# Patient Record
Sex: FEMALE | Race: BLACK OR AFRICAN AMERICAN | Employment: OTHER | URBAN - METROPOLITAN AREA
[De-identification: names, ages, dates, MRNs, and addresses within clinical notes are randomized per-mention and may not be internally consistent; named-entity substitution may affect disease eponyms.]

---

## 2020-08-24 PROBLEM — M54.41 CHRONIC RIGHT-SIDED LOW BACK PAIN WITH RIGHT-SIDED SCIATICA: Status: ACTIVE | Noted: 2020-08-24

## 2020-08-24 PROBLEM — E11.42 DIABETIC POLYNEUROPATHY ASSOCIATED WITH TYPE 2 DIABETES MELLITUS (HCC): Status: ACTIVE | Noted: 2020-08-24

## 2020-08-24 PROBLEM — G89.29 CHRONIC RIGHT-SIDED LOW BACK PAIN WITH RIGHT-SIDED SCIATICA: Status: ACTIVE | Noted: 2020-08-24

## 2020-09-29 ENCOUNTER — HOSPITAL ENCOUNTER (OUTPATIENT)
Dept: LAB | Age: 65
Discharge: HOME | End: 2020-09-29
Attending: PSYCHIATRY & NEUROLOGY
Payer: MEDICARE

## 2020-09-29 DIAGNOSIS — E11.42 DIABETIC POLYNEUROPATHY ASSOCIATED WITH TYPE 2 DIABETES MELLITUS (HCC): ICD-10-CM

## 2020-09-29 LAB
EST. AVERAGE GLUCOSE BLD GHB EST-MCNC: 212 MG/DL
HBA1C MFR BLD: 9 %
T4 FREE SERPL-MCNC: 1.1 NG/DL (ref 0.78–1.46)
VIT B12 SERPL-MCNC: 528 PG/ML (ref 193–986)

## 2020-09-29 PROCEDURE — 84439 ASSAY OF FREE THYROXINE: CPT

## 2020-09-29 PROCEDURE — 86334 IMMUNOFIX E-PHORESIS SERUM: CPT

## 2020-09-29 PROCEDURE — 36415 COLL VENOUS BLD VENIPUNCTURE: CPT

## 2020-09-29 PROCEDURE — 84165 PROTEIN E-PHORESIS SERUM: CPT

## 2020-09-29 PROCEDURE — 82607 VITAMIN B-12: CPT

## 2020-09-29 PROCEDURE — 83036 HEMOGLOBIN GLYCOSYLATED A1C: CPT

## 2020-09-29 NOTE — PROGRESS NOTES
Please notify patient that her hemoglobin A1c was high at 9.0%, she needs to work on diabetic control with primary care physician.

## 2020-09-30 LAB
ALBUMIN SERPL ELPH-MCNC: 3.68 G/DL (ref 3.2–5.6)
ALBUMIN/GLOB SERPL: 1.2 {RATIO}
ALPHA1 GLOB SERPL ELPH-MCNC: 0.18 G/DL (ref 0.1–0.4)
ALPHA2 GLOB SERPL ELPH-MCNC: 0.84 G/DL (ref 0.4–1.2)
B-GLOBULIN SERPL QL ELPH: 1.07 G/DL (ref 0.6–1.3)
GAMMA GLOB MFR SERPL ELPH: 1.04 G/DL (ref 0.5–1.6)
IGA SERPL-MCNC: 113 MG/DL (ref 85–499)
IGG SERPL-MCNC: 940 MG/DL (ref 610–1616)
IGM SERPL-MCNC: 39 MG/DL (ref 35–242)
M PROTEIN SERPL ELPH-MCNC: NORMAL G/DL
PROT PATTERN SERPL ELPH-IMP: NORMAL
PROT PATTERN SPEC IFE-IMP: NORMAL
PROT SERPL-MCNC: 6.8 G/DL (ref 6.3–8.2)

## 2020-10-01 NOTE — PROGRESS NOTES
B12, T4 thyroxin were normal.  Protein electrophoresis was essentially normal but reported a faint band, will check serum light chain, could you call pt and place lab order, then mail to pt, no fasting needed, thx

## 2020-10-02 ENCOUNTER — HOSPITAL ENCOUNTER (OUTPATIENT)
Dept: GENERAL RADIOLOGY | Age: 65
Discharge: HOME | End: 2020-10-02
Attending: PSYCHIATRY & NEUROLOGY
Payer: MEDICARE

## 2020-10-02 DIAGNOSIS — M25.551 RIGHT HIP PAIN: ICD-10-CM

## 2020-10-02 DIAGNOSIS — G89.29 CHRONIC RIGHT-SIDED LOW BACK PAIN WITH RIGHT-SIDED SCIATICA: ICD-10-CM

## 2020-10-02 DIAGNOSIS — M54.18 RADICULAR PAIN OF SACRUM: ICD-10-CM

## 2020-10-02 DIAGNOSIS — M54.41 CHRONIC RIGHT-SIDED LOW BACK PAIN WITH RIGHT-SIDED SCIATICA: ICD-10-CM

## 2020-10-02 PROCEDURE — 72200 X-RAY EXAM SI JOINTS: CPT

## 2020-10-02 PROCEDURE — 73502 X-RAY EXAM HIP UNI 2-3 VIEWS: CPT

## 2020-10-02 PROCEDURE — 72114 X-RAY EXAM L-S SPINE BENDING: CPT

## 2020-10-02 PROCEDURE — 72220 X-RAY EXAM SACRUM TAILBONE: CPT

## 2020-11-18 PROBLEM — M25.559 HIP PAIN: Status: ACTIVE | Noted: 2020-11-18

## 2020-11-18 PROBLEM — E11.42 DIABETIC SENSORY POLYNEUROPATHY (HCC): Status: ACTIVE | Noted: 2020-11-18

## 2022-03-18 PROBLEM — E11.42 DIABETIC SENSORY POLYNEUROPATHY (HCC): Status: ACTIVE | Noted: 2020-11-18

## 2022-03-19 PROBLEM — G89.29 CHRONIC RIGHT-SIDED LOW BACK PAIN WITH RIGHT-SIDED SCIATICA: Status: ACTIVE | Noted: 2020-08-24

## 2022-03-19 PROBLEM — M25.559 HIP PAIN: Status: ACTIVE | Noted: 2020-11-18

## 2022-03-19 PROBLEM — M54.41 CHRONIC RIGHT-SIDED LOW BACK PAIN WITH RIGHT-SIDED SCIATICA: Status: ACTIVE | Noted: 2020-08-24

## 2022-08-10 ENCOUNTER — APPOINTMENT (OUTPATIENT)
Dept: CT IMAGING | Age: 67
DRG: 177 | End: 2022-08-10
Payer: MEDICARE

## 2022-08-10 ENCOUNTER — HOSPITAL ENCOUNTER (EMERGENCY)
Age: 67
Discharge: ANOTHER HOSPITAL | DRG: 177 | End: 2022-08-10
Attending: EMERGENCY MEDICINE
Payer: MEDICARE

## 2022-08-10 ENCOUNTER — HOSPITAL ENCOUNTER (INPATIENT)
Age: 67
LOS: 1 days | Discharge: HOME | DRG: 177 | End: 2022-08-13
Attending: FAMILY MEDICINE | Admitting: FAMILY MEDICINE
Payer: MEDICARE

## 2022-08-10 ENCOUNTER — APPOINTMENT (OUTPATIENT)
Dept: GENERAL RADIOLOGY | Age: 67
DRG: 177 | End: 2022-08-10
Payer: MEDICARE

## 2022-08-10 VITALS
DIASTOLIC BLOOD PRESSURE: 67 MMHG | OXYGEN SATURATION: 92 % | HEIGHT: 67 IN | RESPIRATION RATE: 18 BRPM | BODY MASS INDEX: 31.81 KG/M2 | SYSTOLIC BLOOD PRESSURE: 108 MMHG | TEMPERATURE: 99.5 F | HEART RATE: 89 BPM | WEIGHT: 202.7 LBS

## 2022-08-10 DIAGNOSIS — N17.9 AKI (ACUTE KIDNEY INJURY) (HCC): ICD-10-CM

## 2022-08-10 DIAGNOSIS — U07.1 COVID: Primary | ICD-10-CM

## 2022-08-10 DIAGNOSIS — R29.90 STROKE-LIKE SYMPTOMS: Primary | ICD-10-CM

## 2022-08-10 PROBLEM — E66.09 CLASS 1 OBESITY DUE TO EXCESS CALORIES WITH SERIOUS COMORBIDITY AND BODY MASS INDEX (BMI) OF 31.0 TO 31.9 IN ADULT: Chronic | Status: ACTIVE | Noted: 2022-08-10

## 2022-08-10 PROBLEM — E11.42 DIABETIC SENSORY POLYNEUROPATHY (HCC): Status: ACTIVE | Noted: 2020-11-18

## 2022-08-10 PROBLEM — I10 HYPERTENSION, BENIGN: Status: ACTIVE | Noted: 2022-04-08

## 2022-08-10 PROBLEM — M51.369 DDD (DEGENERATIVE DISC DISEASE), LUMBAR: Status: ACTIVE | Noted: 2021-03-23

## 2022-08-10 PROBLEM — N18.31 ACUTE RENAL FAILURE SUPERIMPOSED ON STAGE 3A CHRONIC KIDNEY DISEASE (HCC): Status: ACTIVE | Noted: 2022-08-10

## 2022-08-10 PROBLEM — E78.2 MIXED HYPERLIPIDEMIA: Status: ACTIVE | Noted: 2022-04-08

## 2022-08-10 PROBLEM — M51.35 DEGENERATION OF THORACOLUMBAR INTERVERTEBRAL DISC: Status: ACTIVE | Noted: 2022-04-08

## 2022-08-10 PROBLEM — E66.811 CLASS 1 OBESITY DUE TO EXCESS CALORIES WITH SERIOUS COMORBIDITY AND BODY MASS INDEX (BMI) OF 31.0 TO 31.9 IN ADULT: Chronic | Status: ACTIVE | Noted: 2022-08-10

## 2022-08-10 PROBLEM — E11.65 UNCONTROLLED TYPE 2 DIABETES MELLITUS WITH HYPERGLYCEMIA (HCC): Status: ACTIVE | Noted: 2022-04-08

## 2022-08-10 PROBLEM — M51.36 DDD (DEGENERATIVE DISC DISEASE), LUMBAR: Status: ACTIVE | Noted: 2021-03-23

## 2022-08-10 PROBLEM — M50.122 CERVICAL DISC DISORDER AT C5-C6 LEVEL WITH RADICULOPATHY: Status: ACTIVE | Noted: 2022-07-12

## 2022-08-10 PROBLEM — J96.01 ACUTE HYPOXEMIC RESPIRATORY FAILURE DUE TO COVID-19 (HCC): Status: ACTIVE | Noted: 2022-08-10

## 2022-08-10 LAB
ALBUMIN SERPL-MCNC: 4.4 G/DL (ref 3.2–4.6)
ALBUMIN/GLOB SERPL: 1.5 {RATIO}
ALP SERPL-CCNC: 92 U/L (ref 45–117)
ALT SERPL-CCNC: 14 U/L (ref 13–61)
AMPHET UR QL SCN: NEGATIVE
ANION GAP SERPL CALC-SCNC: 10 MMOL/L (ref 7–16)
AST SERPL-CCNC: 20 U/L (ref 15–37)
BARBITURATES UR QL SCN: NEGATIVE
BASOPHILS # BLD: 0 K/UL (ref 0–0.2)
BASOPHILS NFR BLD: 0 % (ref 0–2)
BENZODIAZ UR QL: NEGATIVE
BILIRUB SERPL-MCNC: 0.4 MG/DL (ref 0.2–1.1)
BUN SERPL-MCNC: 39 MG/DL (ref 7–18)
CALCIUM SERPL-MCNC: 9.3 MG/DL (ref 8.3–10.4)
CANNABINOIDS UR QL SCN: NEGATIVE
CHLORIDE SERPL-SCNC: 94 MMOL/L (ref 98–107)
CO2 SERPL-SCNC: 32 MMOL/L (ref 21–32)
COCAINE UR QL SCN: NEGATIVE
CREAT SERPL-MCNC: 1.87 MG/DL (ref 0.6–1)
DIFFERENTIAL METHOD BLD: ABNORMAL
EOSINOPHIL # BLD: 0.2 K/UL (ref 0–0.8)
EOSINOPHIL NFR BLD: 2 % (ref 0.5–7.8)
ERYTHROCYTE [DISTWIDTH] IN BLOOD BY AUTOMATED COUNT: 13 % (ref 11.9–14.6)
ETHANOL SERPL-MCNC: <10 MG/DL (ref 0–0.08)
FLUAV AG NPH QL IA: NEGATIVE
FLUBV AG NPH QL IA: NEGATIVE
GLOBULIN SER CALC-MCNC: 3 G/DL (ref 2.3–3.5)
GLUCOSE BLD STRIP.AUTO-MCNC: 297 MG/DL (ref 65–100)
GLUCOSE BLD STRIP.AUTO-MCNC: 76 MG/DL (ref 65–100)
GLUCOSE SERPL-MCNC: 70 MG/DL (ref 65–100)
HCT VFR BLD AUTO: 35.8 % (ref 35.8–46.3)
HGB BLD-MCNC: 11.5 G/DL (ref 11.7–15.4)
IMM GRANULOCYTES # BLD AUTO: 0 K/UL (ref 0–0.5)
IMM GRANULOCYTES NFR BLD AUTO: 0 % (ref 0–5)
LYMPHOCYTES # BLD: 2.2 K/UL (ref 0.5–4.6)
LYMPHOCYTES NFR BLD: 28 % (ref 13–44)
MCH RBC QN AUTO: 27.3 PG (ref 26.1–32.9)
MCHC RBC AUTO-ENTMCNC: 32.1 G/DL (ref 31.4–35)
MCV RBC AUTO: 85 FL (ref 79.6–97.8)
METHADONE UR QL: NEGATIVE
MONOCYTES # BLD: 0.6 K/UL (ref 0.1–1.3)
MONOCYTES NFR BLD: 8 % (ref 4–12)
NEUTS SEG # BLD: 5 K/UL (ref 1.7–8.2)
NEUTS SEG NFR BLD: 62 % (ref 43–78)
NRBC # BLD: 0 K/UL (ref 0–0.2)
NT PRO BNP: 116 PG/ML (ref 0–450)
OPIATES UR QL: POSITIVE
PCP UR QL: NEGATIVE
PLATELET # BLD AUTO: 237 K/UL (ref 150–450)
PMV BLD AUTO: 10.6 FL (ref 9.4–12.3)
POTASSIUM SERPL-SCNC: 3.8 MMOL/L (ref 3.5–5.1)
PROT SERPL-MCNC: 7.4 G/DL (ref 6.4–8.2)
RBC # BLD AUTO: 4.21 M/UL (ref 4.05–5.2)
SARS-COV-2 RDRP RESP QL NAA+PROBE: DETECTED
SERVICE CMNT-IMP: ABNORMAL
SERVICE CMNT-IMP: NORMAL
SODIUM SERPL-SCNC: 136 MMOL/L (ref 136–145)
SOURCE: ABNORMAL
SPECIMEN SOURCE: NORMAL
TROPONIN T SERPL HS-MCNC: 16.6 NG/L (ref 0–14)
WBC # BLD AUTO: 8.1 K/UL (ref 4.3–11.1)

## 2022-08-10 PROCEDURE — 85025 COMPLETE CBC W/AUTO DIFF WBC: CPT

## 2022-08-10 PROCEDURE — 87804 INFLUENZA ASSAY W/OPTIC: CPT

## 2022-08-10 PROCEDURE — 6370000000 HC RX 637 (ALT 250 FOR IP): Performed by: EMERGENCY MEDICINE

## 2022-08-10 PROCEDURE — 71045 X-RAY EXAM CHEST 1 VIEW: CPT

## 2022-08-10 PROCEDURE — 96365 THER/PROPH/DIAG IV INF INIT: CPT

## 2022-08-10 PROCEDURE — G0378 HOSPITAL OBSERVATION PER HR: HCPCS

## 2022-08-10 PROCEDURE — 82077 ASSAY SPEC XCP UR&BREATH IA: CPT

## 2022-08-10 PROCEDURE — 83880 ASSAY OF NATRIURETIC PEPTIDE: CPT

## 2022-08-10 PROCEDURE — 94760 N-INVAS EAR/PLS OXIMETRY 1: CPT

## 2022-08-10 PROCEDURE — 6360000002 HC RX W HCPCS: Performed by: EMERGENCY MEDICINE

## 2022-08-10 PROCEDURE — 80053 COMPREHEN METABOLIC PANEL: CPT

## 2022-08-10 PROCEDURE — 2580000003 HC RX 258: Performed by: EMERGENCY MEDICINE

## 2022-08-10 PROCEDURE — 6370000000 HC RX 637 (ALT 250 FOR IP): Performed by: FAMILY MEDICINE

## 2022-08-10 PROCEDURE — 87635 SARS-COV-2 COVID-19 AMP PRB: CPT

## 2022-08-10 PROCEDURE — 80307 DRUG TEST PRSMV CHEM ANLYZR: CPT

## 2022-08-10 PROCEDURE — 84484 ASSAY OF TROPONIN QUANT: CPT

## 2022-08-10 PROCEDURE — 96375 TX/PRO/DX INJ NEW DRUG ADDON: CPT

## 2022-08-10 PROCEDURE — 82962 GLUCOSE BLOOD TEST: CPT

## 2022-08-10 PROCEDURE — 87040 BLOOD CULTURE FOR BACTERIA: CPT

## 2022-08-10 PROCEDURE — 2580000003 HC RX 258: Performed by: FAMILY MEDICINE

## 2022-08-10 PROCEDURE — 96374 THER/PROPH/DIAG INJ IV PUSH: CPT

## 2022-08-10 PROCEDURE — 70450 CT HEAD/BRAIN W/O DYE: CPT

## 2022-08-10 RX ORDER — ALBUTEROL SULFATE 2.5 MG/3ML
2.5 SOLUTION RESPIRATORY (INHALATION)
Status: COMPLETED | OUTPATIENT
Start: 2022-08-10 | End: 2022-08-10

## 2022-08-10 RX ORDER — ALBUTEROL SULFATE 90 UG/1
2 AEROSOL, METERED RESPIRATORY (INHALATION) EVERY 4 HOURS PRN
Status: DISCONTINUED | OUTPATIENT
Start: 2022-08-10 | End: 2022-08-13 | Stop reason: HOSPADM

## 2022-08-10 RX ORDER — 0.9 % SODIUM CHLORIDE 0.9 %
1000 INTRAVENOUS SOLUTION INTRAVENOUS ONCE
Status: COMPLETED | OUTPATIENT
Start: 2022-08-10 | End: 2022-08-11

## 2022-08-10 RX ORDER — ASPIRIN 325 MG
325 TABLET ORAL
Status: COMPLETED | OUTPATIENT
Start: 2022-08-10 | End: 2022-08-10

## 2022-08-10 RX ORDER — SODIUM CHLORIDE 9 MG/ML
INJECTION, SOLUTION INTRAVENOUS CONTINUOUS
Status: ACTIVE | OUTPATIENT
Start: 2022-08-10 | End: 2022-08-11

## 2022-08-10 RX ORDER — LABETALOL HYDROCHLORIDE 5 MG/ML
10 INJECTION, SOLUTION INTRAVENOUS EVERY 10 MIN PRN
Status: DISCONTINUED | OUTPATIENT
Start: 2022-08-10 | End: 2022-08-13 | Stop reason: HOSPADM

## 2022-08-10 RX ORDER — DEXAMETHASONE 4 MG/1
6 TABLET ORAL DAILY
Status: DISCONTINUED | OUTPATIENT
Start: 2022-08-11 | End: 2022-08-13 | Stop reason: HOSPADM

## 2022-08-10 RX ORDER — DEXAMETHASONE SODIUM PHOSPHATE 10 MG/ML
10 INJECTION, SOLUTION INTRAMUSCULAR; INTRAVENOUS
Status: COMPLETED | OUTPATIENT
Start: 2022-08-10 | End: 2022-08-10

## 2022-08-10 RX ORDER — ASPIRIN 300 MG/1
300 SUPPOSITORY RECTAL DAILY
Status: DISCONTINUED | OUTPATIENT
Start: 2022-08-11 | End: 2022-08-13 | Stop reason: HOSPADM

## 2022-08-10 RX ORDER — INSULIN LISPRO 100 [IU]/ML
0-4 INJECTION, SOLUTION INTRAVENOUS; SUBCUTANEOUS NIGHTLY
Status: DISCONTINUED | OUTPATIENT
Start: 2022-08-10 | End: 2022-08-13 | Stop reason: HOSPADM

## 2022-08-10 RX ORDER — POLYETHYLENE GLYCOL 3350 17 G/17G
17 POWDER, FOR SOLUTION ORAL DAILY PRN
Status: DISCONTINUED | OUTPATIENT
Start: 2022-08-10 | End: 2022-08-13 | Stop reason: HOSPADM

## 2022-08-10 RX ORDER — ALBUTEROL SULFATE 90 UG/1
2 AEROSOL, METERED RESPIRATORY (INHALATION) EVERY 6 HOURS PRN
Status: DISCONTINUED | OUTPATIENT
Start: 2022-08-10 | End: 2022-08-10

## 2022-08-10 RX ORDER — ONDANSETRON 2 MG/ML
4 INJECTION INTRAMUSCULAR; INTRAVENOUS EVERY 6 HOURS PRN
Status: DISCONTINUED | OUTPATIENT
Start: 2022-08-10 | End: 2022-08-12

## 2022-08-10 RX ORDER — HEPARIN SODIUM 5000 [USP'U]/ML
5000 INJECTION, SOLUTION INTRAVENOUS; SUBCUTANEOUS EVERY 8 HOURS SCHEDULED
Status: DISCONTINUED | OUTPATIENT
Start: 2022-08-11 | End: 2022-08-13 | Stop reason: HOSPADM

## 2022-08-10 RX ORDER — INSULIN GLARGINE 100 [IU]/ML
40 INJECTION, SOLUTION SUBCUTANEOUS NIGHTLY
Status: DISCONTINUED | OUTPATIENT
Start: 2022-08-10 | End: 2022-08-13 | Stop reason: HOSPADM

## 2022-08-10 RX ORDER — DEXTROSE MONOHYDRATE 100 MG/ML
INJECTION, SOLUTION INTRAVENOUS CONTINUOUS PRN
Status: DISCONTINUED | OUTPATIENT
Start: 2022-08-10 | End: 2022-08-13 | Stop reason: HOSPADM

## 2022-08-10 RX ORDER — MORPHINE SULFATE 4 MG/ML
4 INJECTION INTRAVENOUS ONCE
Status: COMPLETED | OUTPATIENT
Start: 2022-08-10 | End: 2022-08-10

## 2022-08-10 RX ORDER — ENOXAPARIN SODIUM 100 MG/ML
40 INJECTION SUBCUTANEOUS DAILY
Status: DISCONTINUED | OUTPATIENT
Start: 2022-08-11 | End: 2022-08-10

## 2022-08-10 RX ORDER — ASPIRIN 81 MG/1
81 TABLET ORAL DAILY
Status: DISCONTINUED | OUTPATIENT
Start: 2022-08-11 | End: 2022-08-13 | Stop reason: HOSPADM

## 2022-08-10 RX ORDER — ATORVASTATIN CALCIUM 80 MG/1
80 TABLET, FILM COATED ORAL NIGHTLY
Status: DISCONTINUED | OUTPATIENT
Start: 2022-08-10 | End: 2022-08-13 | Stop reason: HOSPADM

## 2022-08-10 RX ORDER — ONDANSETRON 4 MG/1
4 TABLET, ORALLY DISINTEGRATING ORAL EVERY 8 HOURS PRN
Status: DISCONTINUED | OUTPATIENT
Start: 2022-08-10 | End: 2022-08-12

## 2022-08-10 RX ORDER — INSULIN LISPRO 100 [IU]/ML
0-16 INJECTION, SOLUTION INTRAVENOUS; SUBCUTANEOUS
Status: DISCONTINUED | OUTPATIENT
Start: 2022-08-11 | End: 2022-08-13 | Stop reason: HOSPADM

## 2022-08-10 RX ADMIN — ATORVASTATIN CALCIUM 80 MG: 80 TABLET, FILM COATED ORAL at 21:48

## 2022-08-10 RX ADMIN — SODIUM CHLORIDE 1000 ML: 9 INJECTION, SOLUTION INTRAVENOUS at 21:53

## 2022-08-10 RX ADMIN — ALBUTEROL SULFATE 2.5 MG: 2.5 SOLUTION RESPIRATORY (INHALATION) at 17:16

## 2022-08-10 RX ADMIN — ASPIRIN 325 MG: 325 TABLET, FILM COATED ORAL at 15:32

## 2022-08-10 RX ADMIN — DEXAMETHASONE SODIUM PHOSPHATE 10 MG: 10 INJECTION, SOLUTION INTRAMUSCULAR; INTRAVENOUS at 14:15

## 2022-08-10 RX ADMIN — SODIUM CHLORIDE: 9 INJECTION, SOLUTION INTRAVENOUS at 22:00

## 2022-08-10 RX ADMIN — MORPHINE SULFATE 4 MG: 4 INJECTION, SOLUTION INTRAMUSCULAR; INTRAVENOUS at 16:32

## 2022-08-10 RX ADMIN — INSULIN GLARGINE 40 UNITS: 100 INJECTION, SOLUTION SUBCUTANEOUS at 21:48

## 2022-08-10 RX ADMIN — CEFTRIAXONE 1000 MG: 1 INJECTION, POWDER, FOR SOLUTION INTRAMUSCULAR; INTRAVENOUS at 14:18

## 2022-08-10 ASSESSMENT — PAIN - FUNCTIONAL ASSESSMENT: PAIN_FUNCTIONAL_ASSESSMENT: 0-10

## 2022-08-10 ASSESSMENT — PAIN SCALES - GENERAL
PAINLEVEL_OUTOF10: 9
PAINLEVEL_OUTOF10: 0
PAINLEVEL_OUTOF10: 9
PAINLEVEL_OUTOF10: 0
PAINLEVEL_OUTOF10: 5
PAINLEVEL_OUTOF10: 8

## 2022-08-10 ASSESSMENT — PAIN DESCRIPTION - ORIENTATION
ORIENTATION: RIGHT

## 2022-08-10 ASSESSMENT — PAIN DESCRIPTION - DESCRIPTORS
DESCRIPTORS: CRAMPING;THROBBING
DESCRIPTORS: ACHING

## 2022-08-10 ASSESSMENT — PAIN DESCRIPTION - LOCATION
LOCATION: LEG
LOCATION: LEG

## 2022-08-10 ASSESSMENT — ENCOUNTER SYMPTOMS
VOMITING: 0
SHORTNESS OF BREATH: 1

## 2022-08-10 NOTE — ED TRIAGE NOTES
Pt to ED c/o right sided weakness since 1800 [id number]. Pt brought in by . FSBGL 78. Pt is confused as to her current location and situation.

## 2022-08-10 NOTE — ED NOTES
[facility] Tele-neurology consult entered. ConnectID: [id number]. Zipit for Code S sent out.      Dione Lira RN  08/10/22 [id number]

## 2022-08-10 NOTE — ED PROVIDER NOTES
[employer] Emergency Department Provider Note                   PCP:                Rudy Tapia MD               Age: 73 y.o. Sex: female     No diagnosis found. DISPOSITION         MDM  Number of Diagnoses or Management Options  RYAN (acute kidney injury) ([facility] [address]  COVID  Diagnosis management comments: Teleneurology recommended aspirin 325 now and then aspirin 81 mg daily. He also recommended MRI brain as well as MRI neck With and without contrast.       Amount and/or Complexity of Data Reviewed  Clinical lab tests: ordered and reviewed  Tests in the radiology section of CPT®: ordered and reviewed  Review and summarize past medical records: yes  Independent visualization of images, tracings, or specimens: yes    Risk of Complications, Morbidity, and/or Mortality  Presenting problems: high  Diagnostic procedures: high  Management options: high    Critical Care  Total time providing critical care: 30-74 minutes    Patient Progress  Patient progress: stable       Orders Placed This Encounter   Procedures    Blood Culture 1    Blood Culture 2    COVID-19, Rapid    Rapid influenza A/B antigens    XR CHEST PORTABLE    CT Head W/O Contrast    CBC with Auto Differential    CMP    ETOH    Troponin T    Brain Natriuretic Peptide    Urine Drug Screen    IP CONSULT TO TELE-NEUROLOGY    EKG 12 Lead        Giovani Hyatt is a 73 y.o. female who presents to the Emergency Department with chief complaint of    Chief Complaint   Patient presents with    Extremity Weakness     Right sided      Patient is a 79-year-old female who presents with right-sided weakness she states acute onset 6 PM yesterday and worse now. Patient states she has difficulty walking. Patient has no history of CVAs and denies any difficulty speaking. Separately, patient states she has had a cold cough and congestion for the last 2 to 3 days with clear phlegm. Patient states she has been short of breath for the last 2 days. Patient states she has been intermittently wheezing for approximately 1 month in which her doctor gave her albuterol MDI. Patient does have a history of insulin-dependent diabetes hyperlipidemia hypertension hysterectomy and asthma. Patient denies any COVID history and denies any intubations. The history is provided by the patient. Neurologic Problem  Primary symptoms include focal weakness. This is a new problem. The current episode started 12 to 24 hours ago. The problem has been gradually worsening. There was right upper extremity and right lower extremity focality noted. There has been no fever. Associated symptoms include shortness of breath. Pertinent negatives include no chest pain, no vomiting and no altered mental status. Associated medical issues do not include trauma, a bleeding disorder, seizures or a clotting disorder. All other systems reviewed and are negative. Review of Systems   Constitutional:  Positive for chills, fatigue and fever. Respiratory:  Positive for shortness of breath. Cardiovascular:  Negative for chest pain. Gastrointestinal:  Negative for vomiting. Neurological:  Positive for focal weakness. All other systems reviewed and are negative. Past Medical History:   Diagnosis Date    Chronic right-sided low back pain with right-sided sciatica 8/24/2020    [facility]abetic [facility] with [facility] 8/24/2020    Diabetic sensory polyneuropathy ([facility] 11/18/2020    Hip pain 11/18/2020        No past surgical history on file. No family history on file.      Social History     Socioeconomic History    Marital status:    Tobacco Use    Smoking status: Never    Smokeless tobacco: Never        Allergies: Codeine    Previous Medications    ATORVASTATIN (LIPITOR) 10 MG TABLET    Take 10 mg by mouth daily    BACLOFEN (LIORESAL) 10 MG TABLET    Take 10 mg by mouth as needed    CHOLECALCIFEROL 75 MCG (3000 UT) TABS    Take by mouth GABAPENTIN (NEURONTIN) 600 MG TABLET    Take 600 mg by mouth 3 times daily. MELOXICAM (MOBIC) 15 MG TABLET    Take 15 mg by mouth daily    METFORMIN (GLUCOPHAGE) 1000 MG TABLET    Take 1,000 mg by mouth 2 times daily (with meals)    VALSARTAN-HYDROCHLOROTHIAZIDE (DIOVAN-HCT) 320-12.5 MG PER TABLET    Take 1 tablet by mouth daily    VERAPAMIL (VERELAN) 240 MG EXTENDED RELEASE CAPSULE    Take 240 mg by mouth daily        Vitals signs and nursing note reviewed. Patient Vitals for the past 4 hrs:   Temp Pulse Resp BP SpO2   08/10/22 1322 99.9 °F (37.7 °C) 88 20 (!) 110/93 91 %          Physical Exam  Vitals and nursing note reviewed. Constitutional:       Appearance: Normal appearance. HENT:      Head: Normocephalic and atraumatic. Nose: Nose normal.      Mouth/Throat:      Mouth: Mucous membranes are dry. Pharynx: Oropharynx is clear. Eyes:      Extraocular Movements: Extraocular movements intact. Conjunctiva/sclera: Conjunctivae normal.      Pupils: Pupils are equal, round, and reactive to light. Cardiovascular:      Rate and Rhythm: Normal rate. Pulses: Normal pulses. Pulmonary:      Effort: Pulmonary effort is normal.      Breath sounds: Wheezing present. Comments: Decreased breath sounds bilaterally  Abdominal:      General: Abdomen is flat. Bowel sounds are normal.      Palpations: Abdomen is soft. Musculoskeletal:         General: Normal range of motion. Cervical back: Normal range of motion and neck supple. Skin:     General: Skin is warm and dry. Capillary Refill: Capillary refill takes less than 2 seconds. Neurological:      General: No focal deficit present. Mental Status: She is alert and oriented to person, place, and time. Mental status is at baseline. Psychiatric:         Mood and Affect: Mood normal.         Behavior: Behavior normal.         Thought Content:  Thought content normal.         Judgment: Judgment normal. Procedures    ED EKG Interpretation  EKG was interpreted in the absence of a cardiologist.    Rate: Rate: Normal  EKG Interpretation: EKG Interpretation: no acute changes  ST Segments: Normal ST segments - NO STEMI    Labs Reviewed   CULTURE, BLOOD 1   CULTURE, BLOOD 1   COVID-19, RAPID   RAPID INFLUENZA A/B ANTIGENS   CBC WITH AUTO DIFFERENTIAL   COMPREHENSIVE METABOLIC PANEL   ETHANOL   TROPONIN T   BRAIN NATRIURETIC PEPTIDE   URINE DRUG SCREEN        XR CHEST PORTABLE    (Results Pending)   CT Head W/O Contrast    (Results Pending)                            Voice dictation software was used during the making of this note. This software is not perfect and grammatical and other typographical errors may be present. This note has not been completely proofread for errors.       Patricia Briones MD  08/10/22 5331

## 2022-08-11 ENCOUNTER — APPOINTMENT (OUTPATIENT)
Dept: NON INVASIVE DIAGNOSTICS | Age: 67
DRG: 177 | End: 2022-08-11
Attending: FAMILY MEDICINE
Payer: MEDICARE

## 2022-08-11 LAB
25(OH)D3 SERPL-MCNC: 46.6 NG/ML (ref 30–100)
ALBUMIN SERPL-MCNC: 3.6 G/DL (ref 3.2–4.6)
ALBUMIN/GLOB SERPL: 0.9 {RATIO} (ref 1.2–3.5)
ALP SERPL-CCNC: 97 U/L (ref 50–136)
ALT SERPL-CCNC: 19 U/L (ref 12–65)
ANION GAP SERPL CALC-SCNC: 6 MMOL/L (ref 7–16)
AST SERPL-CCNC: 16 U/L (ref 15–37)
BASOPHILS # BLD: 0 K/UL (ref 0–0.2)
BASOPHILS NFR BLD: 0 % (ref 0–2)
BILIRUB SERPL-MCNC: 0.5 MG/DL (ref 0.2–1.1)
BUN SERPL-MCNC: 35 MG/DL (ref 8–23)
CALCIUM SERPL-MCNC: 8.9 MG/DL (ref 8.3–10.4)
CHLORIDE SERPL-SCNC: 102 MMOL/L (ref 98–107)
CHOLEST SERPL-MCNC: 159 MG/DL
CO2 SERPL-SCNC: 28 MMOL/L (ref 21–32)
CREAT SERPL-MCNC: 1.4 MG/DL (ref 0.6–1)
CRP SERPL-MCNC: 1 MG/DL (ref 0–0.9)
D DIMER PPP FEU-MCNC: 0.53 UG/ML(FEU)
DIFFERENTIAL METHOD BLD: ABNORMAL
ECHO AO ASC DIAM: 2.8 CM
ECHO AO ASCENDING AORTA INDEX: 1.39 CM/M2
ECHO AO ROOT DIAM: 2.8 CM
ECHO AO ROOT INDEX: 1.39 CM/M2
ECHO AV AREA PEAK VELOCITY: 2 CM2
ECHO AV AREA VTI: 2 CM2
ECHO AV AREA/BSA PEAK VELOCITY: 1 CM2/M2
ECHO AV AREA/BSA VTI: 1 CM2/M2
ECHO AV MEAN GRADIENT: 5 MMHG
ECHO AV MEAN VELOCITY: 1.1 M/S
ECHO AV PEAK GRADIENT: 10 MMHG
ECHO AV PEAK VELOCITY: 1.6 M/S
ECHO AV VELOCITY RATIO: 0.63
ECHO AV VTI: 33.7 CM
ECHO BSA: 2.06 M2
ECHO IVC PROX: 2.4 CM
ECHO LA AREA 2C: 17.4 CM2
ECHO LA AREA 4C: 15.4 CM2
ECHO LA DIAMETER INDEX: 1.54 CM/M2
ECHO LA DIAMETER: 3.1 CM
ECHO LA MAJOR AXIS: 5 CM
ECHO LA MINOR AXIS: 5.4 CM
ECHO LA TO AORTIC ROOT RATIO: 1.11
ECHO LA VOL BP: 44 ML (ref 22–52)
ECHO LA VOL/BSA BIPLANE: 22 ML/M2 (ref 16–34)
ECHO LV E' LATERAL VELOCITY: 13 CM/S
ECHO LV E' SEPTAL VELOCITY: 12 CM/S
ECHO LV EDV A2C: 156 ML
ECHO LV EDV A4C: 115 ML
ECHO LV EDV INDEX A4C: 57 ML/M2
ECHO LV EDV NDEX A2C: 78 ML/M2
ECHO LV EJECTION FRACTION A2C: 62 %
ECHO LV EJECTION FRACTION A4C: 62 %
ECHO LV EJECTION FRACTION BIPLANE: 61 % (ref 55–100)
ECHO LV ESV A2C: 60 ML
ECHO LV ESV A4C: 44 ML
ECHO LV ESV INDEX A2C: 30 ML/M2
ECHO LV ESV INDEX A4C: 22 ML/M2
ECHO LV FRACTIONAL SHORTENING: 31 % (ref 28–44)
ECHO LV INTERNAL DIMENSION DIASTOLE INDEX: 1.94 CM/M2
ECHO LV INTERNAL DIMENSION DIASTOLIC: 3.9 CM (ref 3.9–5.3)
ECHO LV INTERNAL DIMENSION SYSTOLIC INDEX: 1.34 CM/M2
ECHO LV INTERNAL DIMENSION SYSTOLIC: 2.7 CM
ECHO LV IVSD: 0.9 CM (ref 0.6–0.9)
ECHO LV MASS 2D: 105.3 G (ref 67–162)
ECHO LV MASS INDEX 2D: 52.4 G/M2 (ref 43–95)
ECHO LV POSTERIOR WALL DIASTOLIC: 0.9 CM (ref 0.6–0.9)
ECHO LV RELATIVE WALL THICKNESS RATIO: 0.46
ECHO LVOT AREA: 3.1 CM2
ECHO LVOT AV VTI INDEX: 0.62
ECHO LVOT DIAM: 2 CM
ECHO LVOT MEAN GRADIENT: 2 MMHG
ECHO LVOT PEAK GRADIENT: 4 MMHG
ECHO LVOT PEAK VELOCITY: 1 M/S
ECHO LVOT STROKE VOLUME INDEX: 32.8 ML/M2
ECHO LVOT SV: 65.9 ML
ECHO LVOT VTI: 21 CM
ECHO MV A VELOCITY: 0.9 M/S
ECHO MV E DECELERATION TIME (DT): 199 MS
ECHO MV E VELOCITY: 1.08 M/S
ECHO MV E/A RATIO: 1.2
ECHO MV E/E' LATERAL: 8.31
ECHO MV E/E' RATIO (AVERAGED): 8.65
ECHO MV E/E' SEPTAL: 9
ECHO RV BASAL DIMENSION: 2.8 CM
ECHO RV INTERNAL DIMENSION: 3.4 CM
ECHO RV TAPSE: 1.9 CM (ref 1.7–?)
EOSINOPHIL # BLD: 0 K/UL (ref 0–0.8)
EOSINOPHIL NFR BLD: 0 % (ref 0.5–7.8)
ERYTHROCYTE [DISTWIDTH] IN BLOOD BY AUTOMATED COUNT: 13 % (ref 11.9–14.6)
GLOBULIN SER CALC-MCNC: 3.8 G/DL (ref 2.3–3.5)
GLUCOSE BLD STRIP.AUTO-MCNC: 193 MG/DL (ref 65–100)
GLUCOSE BLD STRIP.AUTO-MCNC: 248 MG/DL (ref 65–100)
GLUCOSE BLD STRIP.AUTO-MCNC: 271 MG/DL (ref 65–100)
GLUCOSE BLD STRIP.AUTO-MCNC: 298 MG/DL (ref 65–100)
GLUCOSE SERPL-MCNC: 260 MG/DL (ref 65–100)
HCT VFR BLD AUTO: 38.5 % (ref 35.8–46.3)
HDLC SERPL-MCNC: 49 MG/DL (ref 40–60)
HDLC SERPL: 3.2 {RATIO}
HGB BLD-MCNC: 12.2 G/DL (ref 11.7–15.4)
IMM GRANULOCYTES # BLD AUTO: 0 K/UL (ref 0–0.5)
IMM GRANULOCYTES NFR BLD AUTO: 1 % (ref 0–5)
LDLC SERPL CALC-MCNC: 92.2 MG/DL
LYMPHOCYTES # BLD: 0.9 K/UL (ref 0.5–4.6)
LYMPHOCYTES NFR BLD: 15 % (ref 13–44)
MAGNESIUM SERPL-MCNC: 2 MG/DL (ref 1.8–2.4)
MCH RBC QN AUTO: 27.1 PG (ref 26.1–32.9)
MCHC RBC AUTO-ENTMCNC: 31.7 G/DL (ref 31.4–35)
MCV RBC AUTO: 85.4 FL (ref 79.6–97.8)
MONOCYTES # BLD: 0.2 K/UL (ref 0.1–1.3)
MONOCYTES NFR BLD: 2 % (ref 4–12)
NEUTS SEG # BLD: 5.1 K/UL (ref 1.7–8.2)
NEUTS SEG NFR BLD: 82 % (ref 43–78)
NRBC # BLD: 0 K/UL (ref 0–0.2)
PLATELET # BLD AUTO: 245 K/UL (ref 150–450)
PMV BLD AUTO: 11.1 FL (ref 9.4–12.3)
POTASSIUM SERPL-SCNC: 4.6 MMOL/L (ref 3.5–5.1)
PROCALCITONIN SERPL-MCNC: <0.05 NG/ML (ref 0–0.49)
PROT SERPL-MCNC: 7.4 G/DL (ref 6.3–8.2)
RBC # BLD AUTO: 4.51 M/UL (ref 4.05–5.2)
SERVICE CMNT-IMP: ABNORMAL
SODIUM SERPL-SCNC: 136 MMOL/L (ref 136–145)
T3 SERPL-MCNC: 0.6 NG/ML (ref 0.6–1.81)
T4 FREE SERPL-MCNC: 1.1 NG/DL (ref 0.78–1.46)
TRIGL SERPL-MCNC: 89 MG/DL (ref 35–150)
TSH W FREE THYROID IF ABNORMAL: 0.29 UIU/ML (ref 0.36–3.74)
VIT B12 SERPL-MCNC: 1314 PG/ML (ref 193–986)
VLDLC SERPL CALC-MCNC: 17.8 MG/DL (ref 6–23)
WBC # BLD AUTO: 6.2 K/UL (ref 4.3–11.1)

## 2022-08-11 PROCEDURE — 92610 EVALUATE SWALLOWING FUNCTION: CPT

## 2022-08-11 PROCEDURE — G0378 HOSPITAL OBSERVATION PER HR: HCPCS

## 2022-08-11 PROCEDURE — 82962 GLUCOSE BLOOD TEST: CPT

## 2022-08-11 PROCEDURE — 6360000002 HC RX W HCPCS: Performed by: FAMILY MEDICINE

## 2022-08-11 PROCEDURE — 93306 TTE W/DOPPLER COMPLETE: CPT | Performed by: INTERNAL MEDICINE

## 2022-08-11 PROCEDURE — 96372 THER/PROPH/DIAG INJ SC/IM: CPT

## 2022-08-11 PROCEDURE — 6360000004 HC RX CONTRAST MEDICATION: Performed by: INTERNAL MEDICINE

## 2022-08-11 PROCEDURE — 2580000003 HC RX 258: Performed by: INTERNAL MEDICINE

## 2022-08-11 PROCEDURE — 6370000000 HC RX 637 (ALT 250 FOR IP): Performed by: FAMILY MEDICINE

## 2022-08-11 PROCEDURE — 84443 ASSAY THYROID STIM HORMONE: CPT

## 2022-08-11 PROCEDURE — 80061 LIPID PANEL: CPT

## 2022-08-11 PROCEDURE — 80053 COMPREHEN METABOLIC PANEL: CPT

## 2022-08-11 PROCEDURE — 85379 FIBRIN DEGRADATION QUANT: CPT

## 2022-08-11 PROCEDURE — 97165 OT EVAL LOW COMPLEX 30 MIN: CPT

## 2022-08-11 PROCEDURE — 86140 C-REACTIVE PROTEIN: CPT

## 2022-08-11 PROCEDURE — C8929 TTE W OR WO FOL WCON,DOPPLER: HCPCS

## 2022-08-11 PROCEDURE — 82306 VITAMIN D 25 HYDROXY: CPT

## 2022-08-11 PROCEDURE — 84480 ASSAY TRIIODOTHYRONINE (T3): CPT

## 2022-08-11 PROCEDURE — 97530 THERAPEUTIC ACTIVITIES: CPT

## 2022-08-11 PROCEDURE — 83036 HEMOGLOBIN GLYCOSYLATED A1C: CPT

## 2022-08-11 PROCEDURE — 97535 SELF CARE MNGMENT TRAINING: CPT

## 2022-08-11 PROCEDURE — 2700000000 HC OXYGEN THERAPY PER DAY

## 2022-08-11 PROCEDURE — 94640 AIRWAY INHALATION TREATMENT: CPT

## 2022-08-11 PROCEDURE — 94760 N-INVAS EAR/PLS OXIMETRY 1: CPT

## 2022-08-11 PROCEDURE — 82607 VITAMIN B-12: CPT

## 2022-08-11 PROCEDURE — 97161 PT EVAL LOW COMPLEX 20 MIN: CPT

## 2022-08-11 PROCEDURE — 83735 ASSAY OF MAGNESIUM: CPT

## 2022-08-11 PROCEDURE — 84439 ASSAY OF FREE THYROXINE: CPT

## 2022-08-11 PROCEDURE — 97162 PT EVAL MOD COMPLEX 30 MIN: CPT

## 2022-08-11 PROCEDURE — 85025 COMPLETE CBC W/AUTO DIFF WBC: CPT

## 2022-08-11 PROCEDURE — 96375 TX/PRO/DX INJ NEW DRUG ADDON: CPT

## 2022-08-11 PROCEDURE — 84145 PROCALCITONIN (PCT): CPT

## 2022-08-11 PROCEDURE — 36415 COLL VENOUS BLD VENIPUNCTURE: CPT

## 2022-08-11 PROCEDURE — 6370000000 HC RX 637 (ALT 250 FOR IP): Performed by: INTERNAL MEDICINE

## 2022-08-11 RX ORDER — CLOPIDOGREL BISULFATE 75 MG/1
75 TABLET ORAL DAILY
Status: DISCONTINUED | OUTPATIENT
Start: 2022-08-11 | End: 2022-08-13 | Stop reason: HOSPADM

## 2022-08-11 RX ADMIN — ASPIRIN 81 MG: 81 TABLET ORAL at 08:15

## 2022-08-11 RX ADMIN — MOMETASONE FUROATE AND FORMOTEROL FUMARATE DIHYDRATE 2 PUFF: 100; 5 AEROSOL RESPIRATORY (INHALATION) at 22:00

## 2022-08-11 RX ADMIN — PERFLUTREN 0.45 ML: 6.52 INJECTION, SUSPENSION INTRAVENOUS at 13:00

## 2022-08-11 RX ADMIN — INSULIN LISPRO 8 UNITS: 100 INJECTION, SOLUTION INTRAVENOUS; SUBCUTANEOUS at 12:41

## 2022-08-11 RX ADMIN — DEXAMETHASONE 6 MG: 4 TABLET ORAL at 08:15

## 2022-08-11 RX ADMIN — HEPARIN SODIUM 5000 UNITS: 5000 INJECTION INTRAVENOUS; SUBCUTANEOUS at 05:52

## 2022-08-11 RX ADMIN — INSULIN GLARGINE 40 UNITS: 100 INJECTION, SOLUTION SUBCUTANEOUS at 21:59

## 2022-08-11 RX ADMIN — INSULIN LISPRO 4 UNITS: 100 INJECTION, SOLUTION INTRAVENOUS; SUBCUTANEOUS at 08:15

## 2022-08-11 RX ADMIN — INSULIN LISPRO 8 UNITS: 100 INJECTION, SOLUTION INTRAVENOUS; SUBCUTANEOUS at 16:58

## 2022-08-11 RX ADMIN — HEPARIN SODIUM 5000 UNITS: 5000 INJECTION INTRAVENOUS; SUBCUTANEOUS at 15:22

## 2022-08-11 RX ADMIN — ATORVASTATIN CALCIUM 80 MG: 80 TABLET, FILM COATED ORAL at 21:58

## 2022-08-11 RX ADMIN — HEPARIN SODIUM 5000 UNITS: 5000 INJECTION INTRAVENOUS; SUBCUTANEOUS at 21:58

## 2022-08-11 RX ADMIN — CLOPIDOGREL BISULFATE 75 MG: 75 TABLET ORAL at 11:50

## 2022-08-11 ASSESSMENT — PAIN SCALES - GENERAL
PAINLEVEL_OUTOF10: 0
PAINLEVEL_OUTOF10: 0

## 2022-08-11 NOTE — PLAN OF CARE
Problem: Respiratory - Adult  Goal: Achieves optimal ventilation and oxygenation  Outcome: Progressing  Flowsheets (Taken 8/11/2022 1619)  Achieves optimal ventilation and oxygenation:   Assess for changes in respiratory status   Respiratory therapy support as indicated   Assess for changes in mentation and behavior   Oxygen supplementation based on oxygen saturation or arterial blood gases   Encourage broncho-pulmonary hygiene including cough, deep breathe, incentive spirometry   Assess and instruct to report shortness of breath or any respiratory difficulty

## 2022-08-11 NOTE — PROGRESS NOTES
PHYSICAL THERAPY Initial Assessment, Daily Note, and AM  (Link to Caseload Tracking:    Acknowledge Orders  Time In/Out  PT Charge Capture  Rehab Caseload Tracker    Elba Ganser is a 73 y.o. female   PRIMARY DIAGNOSIS: Stroke-like symptoms  Stroke-like symptoms [R29.90]       Reason for Referral: Generalized Muscle Weakness (M62.81)  Other lack of cordination (R27.8)  Difficulty in walking, Not elsewhere classified (R26.2)  Other abnormalities of gait and mobility (R26.89)  Unspecified Lack of Coordination (R27.9)  Observation: Payor: [facility] MEDICARE / Plan: [facility] MEDICARE COMPLETE / Product Type: *No Product type* /     ASSESSMENT:     REHAB RECOMMENDATIONS:   Recommendation to date pending progress:  Setting:  Inpatient Rehab Facility    Equipment:    To Be Determined     ASSESSMENT:  Ms. Vikki Pearce is a 73 y.o. female admitted with COVID-19 and R sided weakness. Upon PT evaluation, pt exhibits coordination deficits, impaired cognition, impaired balance, and R sided strength deficits resulting in limited independence with functional mobility. At baseline, pt was mod I for mobility, occasionally using cane for support. Pt is now requiring min A and constant cues for safety while mobilizing in room. .      Pt will benefit from [facility] at discharge, due to acute neuro deficits and wishing to return to independence to continue caring for her grandchild. Pt will continue to benefit from skilled PT to address above impairments and maximize functional independence prior to discharge.   .     [address] [facility] [address] [id number] AM-PAC 6 Clicks Basic Mobility Inpatient Short Form  AM-PAC Mobility Inpatient   How much difficulty turning over in bed?: A Little  How much difficulty sitting down on / standing up from a chair with arms?: A Little  How much difficulty moving from lying on back to sitting on side of bed?: A Little  How much help from another person moving to and from a bed to a chair?: A Little  How much help from another person Sit [] [] [x] [] [] [] [] [] [] [] []    Scooting [] [] [x] [] [] [] [] [] [] [] []    Sit to Supine [] [] [] [] [] [] [] [] [] [] []    Transfers    Sit to Stand [] [] [] [] [x] [] [] [] [] [] []    Bed to Chair [] [] [] [] [x] [] [] [] [] [] []    Stand to Sit [] [] [] [] [x] [] [] [] [] [] []     [] [] [] [] [] [] [] [] [] [] []    I=Independent, Mod I=Modified Independent, S=Supervision, SBA=Standby Assistance, CGA=Contact Guard Assistance,   Min=Minimal Assistance, Mod=Moderate Assistance, Max=Maximal Assistance, Total=Total Assistance, NT=Not Tested    GAIT: I Mod I S SBA CGA Min Mod Max Total  NT x2 Comments:   Level of Assistance [] [] [] [] [] [x] [] [] [] [] []    Distance 60 feet    DME None    Gait Quality Decreased nhi , Decreased step clearance, Decreased step length, Decreased stance, Path deviations , Shuffling , Trunk sway increased, and Wide base of support    Weightbearing Status Restrictions/Precautions  Restrictions/Precautions: Fall Risk, Isolation    Stairs      I=Independent, Mod I=Modified Independent, S=Supervision, SBA=Standby Assistance, CGA=Contact Guard Assistance,   Min=Minimal Assistance, Mod=Moderate Assistance, Max=Maximal Assistance, Total=Total Assistance, NT=Not Tested    PLAN:   ACUTE PHYSICAL THERAPY GOALS:   (Developed with and agreed upon by patient and/or caregiver.)  Pt will perform supine to/from sit with mod I in 7 treatment days. Pt will perform sit to/from stand with mod I and LRAD in 7 treatment days. Pt will ambulate 150 ft with mod I and LRAD in 7 treatment days. Pt will negotiate 1 stairs with mod I and rail in 7 treatment days. Pt will be independent with HEP in 7 days.       FREQUENCY AND DURATION: 3 times/week for duration of hospital stay or until stated goals are met, whichever comes first.    THERAPY PROGNOSIS: Good    PROBLEM LIST:   (Skilled intervention is medically necessary to address:)  Decreased ADL/Functional Activities  Decreased Activity understanding    TIME IN/OUT:  Time In: 0913  Time Out: [address]  Minutes: 100 Socrates Perez, PT

## 2022-08-11 NOTE — DISCHARGE INSTRUCTIONS

## 2022-08-11 NOTE — PROGRESS NOTES
Per nurse patient will be hydrated today and plan MRI with contrast tomorrow. Patient has valium ordered for claustrophobia.

## 2022-08-11 NOTE — PLAN OF CARE
Mary Adams presents today for a reading of her Mantoux Tuberculin Skin Test.    See procedure tab for result.     Signed: Ana Beavers CNP  Subjective   Patient ID: Mary is a 31 year old female.    Chief Complaint   Patient presents with   • PrePlacement Physical   • PPD Reading     HPI HERE FOR PHYSICAL AND TB READING  Patient's medications, allergies, past medical, surgical, social and family histories were reviewed and updated as appropriate.    Review of Systems   All other systems reviewed and are negative.    Objective   Physical Exam  Constitutional:       Appearance: Normal appearance.   HENT:      Head: Normocephalic.      Right Ear: Tympanic membrane, ear canal and external ear normal.      Left Ear: Tympanic membrane, ear canal and external ear normal.      Nose: Nose normal.      Mouth/Throat:      Mouth: Mucous membranes are moist.      Pharynx: Oropharynx is clear.   Eyes:      Conjunctiva/sclera: Conjunctivae normal.      Pupils: Pupils are equal, round, and reactive to light.   Cardiovascular:      Rate and Rhythm: Normal rate and regular rhythm.      Pulses: Normal pulses.      Heart sounds: Normal heart sounds.   Pulmonary:      Effort: Pulmonary effort is normal.      Breath sounds: Normal breath sounds.   Abdominal:      General: Abdomen is flat. Bowel sounds are normal.      Palpations: Abdomen is soft.   Musculoskeletal:         General: Normal range of motion.      Cervical back: Normal range of motion and neck supple.   Skin:     General: Skin is warm and dry.   Neurological:      General: No focal deficit present.      Mental Status: She is alert and oriented to person, place, and time.   Psychiatric:         Mood and Affect: Mood normal.         Behavior: Behavior normal.         Thought Content: Thought content normal.         Judgment: Judgment normal.       Assessment   Diagnoses and all orders for this visit:  Encounter for PPD skin test reading  Physical exam,  Stroke education completed no change in nih scale no current issues at this time pre-employment

## 2022-08-11 NOTE — PROGRESS NOTES
LTG: patient will tolerate safest, least restrictive oral diet without s/sx airway compromise  STG: Patient will tolerate easy to chew diet and thin liquids without overt s/sx aspiration  STG: Patient will participate in modified barium swallow study to objectively assess oropharyngeal swallow if indicated    OBSERVATION STATUS  SPEECH LANGUAGE PATHOLOGY: DYSPHAGIA  Initial Assessment    NAME: Richard Hernandez  : 1955  MRN: [id number]    ADMISSION DATE: 8/10/2022  PRIMARY DIAGNOSIS: Stroke-like symptoms  Stroke-like symptoms [R29.90]    ICD-10: Treatment Diagnosis: R13.12 Dysphagia, Oropharyngeal Phase    RECOMMENDATIONS   Diet:  Diet Solids Recommendation: Easy to Chew  Liquid Consistency Recommendation: Thin    Medications: PO     Recommendations: Setup assist    Compensatory Swallowing Strategies:Eat/Feed slowly;Upright as possible for all oral intake;Remain upright for 30-45 minutes after meals;Small bites/sips; Set up assist   Therapeutic Intervention:Patient/Family education;Diet tolerance monitoring   Patient continues to require skilled intervention: Yes  D/C Recommendations: Ongoing speech therapy is recommended during this hospitalization     ASSESSMENT    Dysphagia Diagnosis: Mild oral stage dysphagia due to limited dentition and respiratory status with noted intermittent expiratory wheezing. No overt s/sx aspiration. GENERAL    History of Present Injury/Illness: Ms. Compa Lucas  has a past medical history of Chronic right-sided low back pain with right-sided sciatica, Diabetic polyneuropathy associated with type 2 diabetes mellitus ([address] Diabetic sensory polyneuropathy ([address] Hip pain, and Mild intermittent asthma. . She also  has a past surgical history that includes Hysterectomy; Cholecystectomy; and Colonoscopy. Chart Reviewed: Yes  Subjective: alert and hungry. Behavior/Cognition: Alert; Cooperative  Communication Observation: Functional (occasional mild specific word finding at [facility] level;reports speech at baseline)  Follows Directions: Simple        O2 Device: Nasal cannula  Liters of Oxygen: 3 L     Prior Dysphagia History: reports sticking sensation with solids yesterday. reports coughs with \"cold\" liquids  Reports felt like hash browns and eggs stuck yesterday (pointing to cervical notch)     Current Diet : NPO  Current Liquid Diet : NPO  Pain:   Patient does not c/o pain                                        OBJECTIVE    Patient Positioning: Upright in bed   Oral Motor   Labial:  (slight right asymmetry)  Oral Hygiene: Moist;Clean  Lingual: No impairment  Dentition: Some missing teeth        Baseline Vocal Quality: Normal  Oropharyngeal Phase:     Assessment Method(s): Observation;Palpation  How Presented: Self-fed/presented;SLP-fed/Presented;Spoon;Straw;Cup/gulp; Successive Swallows  Bolus Acceptance: No impairment  Type of Impairment: Mastication (mildly increased due to limited dentition)  Propulsion: No impairment  Oral Residue: None  Initiation of Swallow: No impairment  Laryngeal Elevation: Functional  Aspiration Signs/Symptoms: None      Patient presented with room temperature(per pt preference) thin liquids via cup and straw, puree, mixed fruit, and crackers. Mastication mildly increased due to missing dentition and mild noted dyspnea, expiratory wheezing with exertion. No overt signs or symptoms of airway compromise observed with liquid or solid textures, no change in vocal quality, and no complaints of sticking sensation. PLAN    Duration/Frequency: Continue to follow patient 2x/week for duration of hospitalization and/or until goals met    Dysphagia Outcome and Severity Scale (SHEYLA)  Dysphagia Outcome Severity Scale: Level 5: Mild dysphagia- Distant supervision.  May need one diet consistency restricted  Interpretation of Tool: The Dysphagia Outcome and Severity Scale (SHEYLA) is a simple, easy-to-use, 7-point scale developed to systematically rate the functional severity of dysphagia based on objective assessment and make recommendations for diet level, independence level, and type of nutrition. Normal(7), Functional(6), Mild(5), Mild-Moderate(4), Moderate(3), Moderate-Severe(2), Severe(1)    Speech Therapy Prognosis  Prognosis: Excellent    Education: Patient, RN  Patient Education: diet consistencies, aspiration precautions, role of SLP  Patient Education Response: Demonstrated understanding, Verbalizes understanding    Current Medications:   No current facility-administered medications on file prior to encounter. Current Outpatient Medications on File Prior to Encounter   Medication Sig Dispense Refill    MORPHINE SULFATE ER PO Take 15 mg by mouth in the morning and at bedtime      atorvastatin (LIPITOR) 10 MG tablet Take 10 mg by mouth daily      baclofen (LIORESAL) 10 MG tablet Take 10 mg by mouth as needed      Cholecalciferol 75 MCG (3000 UT) TABS Take by mouth      gabapentin (NEURONTIN) 600 MG tablet Take 600 mg by mouth 3 times daily.       meloxicam (MOBIC) 15 MG tablet Take 15 mg by mouth daily      metFORMIN (GLUCOPHAGE) 1000 MG tablet Take 1,000 mg by mouth 2 times daily (with meals)      valsartan-hydroCHLOROthiazide (DIOVAN-HCT) 320-12.5 MG per tablet Take 1 tablet by mouth daily      verapamil (VERELAN) 240 MG extended release capsule Take 240 mg by mouth daily         PRECAUTIONS/ALLERGIES: Codeine   Safety Devices in place: Yes  Type of devices: Left in bed, Call light within reach    Therapy Time  SLP Individual Minutes  Time In: 0827  Time Out: 1879  Minutes: [facility] [place]  8/11/2022 8:57 AM

## 2022-08-11 NOTE — PROGRESS NOTES
Patient will not participate in NIHSS assesment, notified Dr. Ivy Soto via [employer], was told to do as much as possible, but scale will not score as things were not assessed. Will continue to monitor patient and assist as needed.

## 2022-08-11 NOTE — PROGRESS NOTES
Patient has been observed during hourly rounds and has slept well overnight. MRI/MRA checklist completed with patient and she reported claustrophobia when having procedure done previously. Will continue to monitor and assist as needed and will prepare to give report to oncoming nurse.

## 2022-08-11 NOTE — PROGRESS NOTES
much help for Toileting?: A Little  How much help for putting on and taking off regular upper body clothing?: A Little  How much help for taking care of personal grooming?: A Little  How much help for eating meals?: A Little  AM-PAC Inpatient Daily Activity Raw Score: 18  AM-PAC Inpatient ADL T-Scale Score : 38.66  ADL Inpatient CMS 0-100% Score: 46.65  ADL Inpatient CMS G-Code Modifier : CK      SUBJECTIVE:     Ms. Beatrice العراقي states, \"Did I have a stroke? \"     Social/Functional Lives With: Spouse, Family  Type of Home: House  Home Layout: One level  Home Access: Stairs to enter with rails  Entrance Stairs - Number of Steps: 1  Home Equipment: [employer], [address] Help From: Family  ADL Assistance: [address]: Independent  Homemaking Responsibilities: Yes  Ambulation Assistance: Independent  Transfer Assistance: Independent    OBJECTIVE:     Eulogio Zuniga / Kaya Desai / Alexis Hook: IV    RESTRICTIONS/PRECAUTIONS:  Restrictions/Precautions: Fall Risk, Isolation    PAIN: Mynor Males / O2:   Pre Treatment:   Pain Assessment: None - Denies Pain      Post Treatment: no change        Vitals          Oxygen   2L, nasal cannula          GROSS EVALUATION: INTACT IMPAIRED   (See Comments)   UE AROM [x] []   UE PROM [x] []   Strength []  Generalized weakness BUEs     Posture / Balance []  Fair+ sitting, fair standing    Sensation []     Coordination []  decreased     Tone [x]       Edema [x]    Activity Tolerance []  Fatigues with activity      Hand Dominance R [] L []      COGNITION/  PERCEPTION: INTACT IMPAIRED   (See Comments)   Orientation [x]     Vision [x]     Hearing [x]     Cognition  []  Mildly confused, verbal cues and tactile cue for follow-through of commands, anxious, delayed processing    Perception []       MOBILITY: I Mod I S SBA CGA Min Mod Max Total  NT x2 Comments:   Bed Mobility    Rolling [] [] [] [] [] [] [] [] [] [x] []    Supine to Sit [] [] [] [] [x] [] [] [] [] [] []    Scooting [] [] [] [] with MOD I.   4. Patient will tolerate 25 minutes of OT treatment with 1-2 rest breaks to increase activity tolerance for ADLs. 5. Patient will complete functional transfers with MOD I and adaptive equipment as needed. 6. Patient will complete functional activity with MOD I and adaptive equipment as needed. Timeframe: 7 visits      PROBLEM LIST:   (Skilled intervention is medically necessary to address:)  Decreased ADL/Functional Activities  Decreased Activity Tolerance  Decreased Balance  Decreased Cognition  Decreased Coordination  Decreased Safety Awareness  Decreased Strength  Decreased Transfer Abilities   INTERVENTIONS PLANNED:  (Benefits and precautions of occupational therapy have been discussed with the patient.)  Self Care Training  Therapeutic Activity  Therapeutic Exercise/HEP  Neuromuscular Re-education  Manual Therapy  Education         TREATMENT:     EVALUATION: LOW COMPLEXITY: (Untimed Charge)    TREATMENT:   Co-Treatment PT/OT necessary due to patient's decreased overall endurance/tolerance levels, as well as need for high level skilled assistance to complete functional transfers/mobility and functional tasks  Self Care (10 minutes): Patient participated in lower body dressing and grooming ADLs in unsupported sitting and standing with moderate verbal and tactile cueing to increase independence, decrease assistance required, increase activity tolerance, and increase safety awareness. Patient also participated in functional mobility and functional transfer training to increase independence, decrease assistance required, increase activity tolerance, and increase safety awareness. TREATMENT GRID:  N/A    AFTER TREATMENT PRECAUTIONS: Alarm Activated, Call light within reach, Chair, Needs within reach, RN notified, and Visitors at bedside    INTERDISCIPLINARY COLLABORATION:  RN/ PCT, PT/ PTA, and OT/ IRAHETA    EDUCATION:  Education Given To: Patient; Family  Education Provided: Role of Therapy;Plan of Care; Fall Prevention Strategies  Education Outcome: Verbalized understanding;Demonstrated understanding    TOTAL TREATMENT DURATION AND TIME:  Time In: 0913  Time Out: 3766  Minutes: 130 Casie Crowder, OT

## 2022-08-11 NOTE — PROGRESS NOTES
TRANSFER - IN REPORT:    Verbal report received from ANA PAULA Bangura on Tracy August  being received from [facility] ER for routine progression of patient care      Report consisted of patient's Situation, Background, Assessment and   Recommendations(SBAR). Information from the following report(s) ED Encounter Summary was reviewed with the receiving nurse. Opportunity for questions and clarification was provided. Assessment  will be completed upon patient's arrival to unit and care assumed.

## 2022-08-11 NOTE — H&P
Hospitalist History and Physical   Admit Date:  8/10/2022  8:19 PM   Name:  Yesica Rai   Age:  73 y.o. Sex:  female  :  1955   MRN:  [id number]   Room:  [facility]/    Presenting Complaint: right sided weakness    Reason(s) for Admission: Stroke-like symptoms [R29.90]     History of Present Illness:   Yesica Rai is a 73 y.o. female with medical history of obesity, T2DM, HTN, HLD, Asthma who presented to [place] ED from home with confusion, drooling, right sided weakness that started acutely yesterday at 1800 and worsed today associated with trouble walking. Denied any trouble speaking. CTH w/o contrast showed no acute findings. Evaluated by tele-neuro with NIHSS 8 and ddx stroke, acute toxic./metabolic encephalopathy. She also c/o SOB, cough, congestion x2-3 days with intermittent wheezing. Has h/o asthma and has been using albuterol with some relief. She was found to be covid positive and spo2 86% on RA. She rec'd ASA 25 and IV decadron and transferred to  for further work up. Review of Systems:  Unable to perform d/t AMS   Assessment & Plan: Active Problems:    Stroke-like symptoms  - admit to remote telemetry. - TpA not indicated, presented outside of window  - loaded with  mg . Start by ASA 81 mg in AM High intensity statin   - Fasting hgbA1C, Lipid panel, TSH, CMP, CBC, B12, UA   - MRI brain without contrast, MRA head/neck WO contrast ; TTE with bubble  - Permissive hypertension to 220/120 mm Hg x 48 hours then goal normotensive with HCTZ +/- ACEi/ARB for strok ppx   - NS 1 L f/b  75 cc/hr x 24 hours   - Long term rhythm monitor at DC to eval for paroxysmal afib/flutter  - Outpatient referral to Neurology for followup  - if AFIB not detected on telemetry, loop vs 30 day cardiac monitoring. Acute renal failure superimposed on stage 3a chronic kidney disease ([facility] [address]  Scr 1.87 (Bcr ~1)  Hydrate.  Check UA   Hold mobic, valsartan/hctz, verapamil   Maintain MAP >65 mm hg Avoid anti-RAAS agents, nephrotoxic agents, and NSAIDs  Renal Dosing Strict I&O, Daily Weights, Daily BMP/CMP     Acute Hypoxemic Respiratory Failure 2/2 COVID-19   - s/p IV in ED. Decadron 6 mg x 9 doses in AM  - check d dimer, procal, vitamin D, CRP in AM   - O2: 3L Wean as able  - Awake proning as tolerated, anti-tussive PRN, Bronchodilators     Acute encephalopathy - suspected 2/2 COVID and metabolic derangements. Possibly hypoxia. Hold morphine 15 mg BID, gabapentin 600 mg TID, and balocfen pending mentation improvement and renal recovery       Cervical disc disorder at C5-C6 level with radiculopathy // Degeneration of thoracolumbar intervertebral disc  Hold morphine 15 mg BID, gabapentin 600 mg TID, and balocfen pending mentation improvement and renal recovery      HTN - hold home meds x 48 hours for perimissive HTN and hold arb until renal recovery      Mixed hyperlipidemia  Plan: high intensity statin. Lipid panel in AM       Uncontrolled type 2 diabetes mellitus with hyperglycemia (HCC)  Start glargine 20 U plus high dose SSI. A1c in AM.   Hold metformin. Check b12       Class 1 obesity due to excess calories with serious comorbidity and body mass index (BMI) of 31.0 to 31.9 in adult  Plan: adds to complexity. Lifestyle modifications. Mild intermittent asthma - start LAMA-ICS, albuterol prn. Diabetic sensory polyneuropathy ([facility] [address]  Plan: glycemic control.  Check b12       Dispo/Discharge Planning:     Admitmmm remote tele     Diet: Diet NPO  VTE ppx: lovenox   Code status: Full Code    Hospital Problems:  Principal Problem:    Stroke-like symptoms  Active Problems:    Cervical disc disorder at C5-C6 level with radiculopathy    Degeneration of thoracolumbar intervertebral disc    Mixed hyperlipidemia    Uncontrolled type 2 diabetes mellitus with hyperglycemia (HCC)    Class 1 obesity due to excess calories with serious comorbidity and body mass index (BMI) of 31.0 to 31.9 in adult    Acute hypoxemic respiratory failure due to COVID-19 [facility])    Acute renal failure superimposed on stage 3a chronic kidney disease (HCC)    Diabetic sensory polyneuropathy ([address]  Resolved Problems:    * No resolved hospital problems. *       Past History:   Diagnosis Date   Abnormal ankle brachial index (ANDRE) 07/2019   Negative   Arthritis   Back pain   Displacement of cervical intervertebral disc   Hypertension   Hypertriglyceridemia   Plantar fasciitis   Thoracic degenerative disc disease   Thromboembolism (HCC)   Type 2 diabetes mellitus ([address]   Vitamin D deficiency     Past Surgical History:   Past Surgical History:   Procedure Laterality Date   CHOLECYSTECTOMY   COLONOSCOPY 2009, 11/2015, 03/01/2021 2015-Dr Cole Specking normal CE   HYSTERECTOMY   STRABISMUS SURGERY Left   when 21yr old   TONSILLECTOMY     Family History:   Family History   Problem Relation Age of Onset   Blindness Mother   Glaucoma Mother   Cataracts Mother   Diabetes Mother   Heart disease Mother   Hypertension Mother   Colon cancer Father   Heart disease Father   Hypertension Father   Diabetes Father   Colon polyps Sister   Diabetes Sister   Diabetes Sister   Colon polyps Brother   Colon cancer Brother   Diabetes Brother   Diabetes Brother   Colon cancer Maternal Grandmother   Colon cancer Maternal Grandfather   Retinal degeneration Neg Hx     Alcohol History: reports no history of alcohol use.     Past Medical History:   Diagnosis Date    Chronic right-sided low back pain with right-sided sciatica 8/24/2020    Diabetic polyneuropathy associated with type 2 diabetes mellitus ([address] 8/24/2020    Diabetic sensory polyneuropathy ([address] 11/18/2020    Hip pain 11/18/2020    Mild intermittent asthma      Social History     Tobacco Use    Smoking status: Never    Smokeless tobacco: Never   Substance Use Topics    Alcohol use: Never      Social History     Substance and Sexual Activity   Drug Use Never           There is no immunization history on file for this patient. Allergies   Allergen Reactions    Codeine Myalgia     Prior to Admit Medications:  Current Outpatient Medications   Medication Instructions    atorvastatin (LIPITOR) 10 mg, Oral, DAILY    baclofen (LIORESAL) 10 mg, Oral, PRN    Cholecalciferol 75 MCG (3000 UT) TABS Oral    gabapentin (NEURONTIN) 600 mg, Oral, 3 TIMES DAILY    meloxicam (MOBIC) 15 mg, Oral, DAILY    metFORMIN (GLUCOPHAGE) 1,000 mg, Oral, 2 TIMES DAILY WITH MEALS    MORPHINE SULFATE ER PO 15 mg, Oral, 2 times daily    valsartan-hydroCHLOROthiazide (DIOVAN-HCT) 320-12.5 MG per tablet 1 tablet, Oral, DAILY    verapamil (VERELAN) 240 mg, Oral, DAILY         Objective:   Patient Vitals for the past 24 hrs:   Temp Pulse Resp BP SpO2   08/10/22 2246 97.5 °F (36.4 °C) 67 16 111/68 96 %   08/10/22 2145 -- 84 16 -- 97 %   08/10/22 2010 98 °F (36.7 °C) 82 16 (!) 104/58 96 %       Oxygen Therapy  SpO2: 96 %  Pulse Oximeter Device Mode: Intermittent  Pulse Oximeter Device Location: Left, Finger  O2 Device: Nasal cannula  Skin Assessment: Clean, dry, & intact  FiO2 : 32 %  O2 Flow Rate (L/min): 3 L/min    Estimated body mass index is 31.75 kg/m² as calculated from the following:    Height as of an earlier encounter on 8/10/22: 5' 7\" (1.702 m). Weight as of an earlier encounter on 8/10/22: 202 lb 11.2 oz (91.9 kg). No intake or output data in the 24 hours ending 08/10/22 2318      Physical Exam:    Blood pressure 111/68, pulse 67, temperature 97.5 °F (36.4 °C), resp. rate 16, SpO2 96 %. General:    Obese. Ill appearing. NAD    Head:  Normocephalic, atraumatic  Eyes:  Sclerae appear normal.  Pupils equally round. ENT:  Nares appear normal, no drainage. Moist oral mucosa  Neck:  No restricted ROM. Trachea midline   CV:   RRR. No m/r/g. No jugular venous distension. Lungs:   NC in tact. CTAB. No wheezing, rhonchi, or rales. Symmetric expansion. Abdomen: Bowel sounds present. Soft, nontender, nondistended.   Extremities: No cyanosis or clubbing. No edema  Skin:     No rashes and normal coloration. Warm and dry. Neuro:  Lethargic. CN II-XII grossly intact. No facial droop. Psych:  Non combative. Not hallucinating.      I have personally reviewed labs and tests showing:  Recent Labs:  Recent Results (from the past 24 hour(s))   POCT Glucose    Collection Time: 08/10/22  1:20 PM   Result Value Ref Range    POC Glucose 76 65 - 100 mg/dL    Performed by: Va    CBC with Auto Differential    Collection Time: 08/10/22  1:50 PM   Result Value Ref Range    WBC 8.1 4.3 - 11.1 K/uL    RBC 4.21 4.05 - 5.20 M/uL    Hemoglobin 11.5 (L) 11.7 - 15.4 g/dL    Hematocrit 35.8 35.8 - 46.3 %    MCV 85.0 79.6 - 97.8 FL    MCH 27.3 26.1 - 32.9 PG    MCHC 32.1 31.4 - 35.0 g/dL    RDW 13.0 11.9 - 14.6 %    Platelets 825 145 - 490 K/uL    MPV 10.6 9.4 - 12.3 FL    nRBC 0.00 0.0 - 0.2 K/uL    Differential Type AUTOMATED      Seg Neutrophils 62 43 - 78 %    Lymphocytes 28 13 - 44 %    Monocytes 8 4.0 - 12.0 %    Eosinophils % 2 0.5 - 7.8 %    Basophils 0 0.0 - 2.0 %    Immature Granulocytes 0 0.0 - 5.0 %    Segs Absolute 5.0 1.7 - 8.2 K/UL    Absolute Lymph # 2.2 0.5 - 4.6 K/UL    Absolute Mono # 0.6 0.1 - 1.3 K/UL    Absolute Eos # 0.2 0.0 - 0.8 K/UL    Basophils Absolute 0.0 0.0 - 0.2 K/UL    Absolute Immature Granulocyte 0.0 0.0 - 0.5 K/UL   CMP    Collection Time: 08/10/22  1:50 PM   Result Value Ref Range    Sodium 136 136 - 145 mmol/L    Potassium 3.8 3.5 - 5.1 mmol/L    Chloride 94 (L) 98 - 107 mmol/L    CO2 32 21 - 32 mmol/L    Anion Gap 10 7.0 - 16.0 mmol/L    Glucose 70 65 - 100 mg/dL    BUN 39 (H) 7.0 - 18.0 MG/DL    Creatinine 1.87 (H) 0.6 - 1.0 MG/DL    GFR African American 35 (L) >60 ml/min/1.73m2    GFR Non- 29 (L) >60 ml/min/1.73m2    Calcium 9.3 8.3 - 10.4 MG/DL    Total Bilirubin 0.4 0.2 - 1.1 MG/DL    ALT 14 13.0 - 61.0 U/L    AST 20 15 - 37 U/L    Alk Phosphatase 92 45.0 - 117.0 U/L    Total Protein 7.4 6.4 - 8.2 g/dL Albumin 4.4 3.2 - 4.6 g/dL    Globulin 3.0 2.3 - 3.5 g/dL    Albumin/Globulin Ratio 1.5     ETOH    Collection Time: 08/10/22  1:50 PM   Result Value Ref Range    Ethanol Lvl <10 (H) 0 MG/DL   Troponin T    Collection Time: 08/10/22  1:50 PM   Result Value Ref Range    Troponin T 16.6 (H) 0 - 14 ng/L   Brain Natriuretic Peptide    Collection Time: 08/10/22  1:50 PM   Result Value Ref Range    NT Pro- 0 - 450 PG/ML   COVID-19, Rapid    Collection Time: 08/10/22  2:22 PM    Specimen: Nasopharyngeal   Result Value Ref Range    Source Nasopharyngeal      SARS-CoV-2, Rapid Detected (A) NOTD     Rapid influenza A/B antigens    Collection Time: 08/10/22  2:22 PM    Specimen: Nasal Washing   Result Value Ref Range    Influenza A Ag Negative NEG      Influenza B Ag Negative NEG      Source Nasopharyngeal     Urine Drug Screen    Collection Time: 08/10/22  4:03 PM   Result Value Ref Range    PCP, Urine Negative NEG      Benzodiazepines, Urine Negative NEG      Cocaine, Urine Negative NEG      Amphetamine, Urine Negative NEG      Methadone, Urine Negative NEG      THC, TH-Cannabinol, Urine Negative NEG      Opiates, Urine Positive (A) NEG      Barbiturates, Urine Negative NEG     POCT Glucose    Collection Time: 08/10/22  8:32 PM   Result Value Ref Range    POC Glucose 297 (H) 65 - 100 mg/dL    Performed by: Pierre        I have personally reviewed imaging studies showing:  CT Head W/O Contrast    Result Date: 8/10/2022  CT of the head without contrast. CLINICAL INDICATION: Right-sided weakness, code stroke PROCEDURE: Serial thin section axial images are obtained from the cranial vertex through the skull base without the administration of intravenous contrast. Radiation dose reduction techniques were used for this study.  Our CT scanners use one or all of the following: Automated exposure control, adjusted of the mA and/or kV according to patient size, iterative reconstruction COMPARISON: No prior FINDINGS: There is no acute intracranial hemorrhage, mass, or mass effect. No abnormal extra-axial fluid collections identified. There is no hydrocephalus. The basilar cisterns are widely patent. The gray-white matter brain parenchymal interface is well-maintained. No skull fracture or aggressive osseous lesion noted. The mastoid air cells and included paranasal sinuses are clear. No acute intracranial abnormality. XR CHEST PORTABLE    Result Date: 8/10/2022  Portable chest x-ray Clinical indications: Cough and shortness of breath FINDINGS: Single AP view of the chest submitted without comparison show the lungs to be slightly underexpanded but otherwise clear. No pleural effusion or pneumothorax. The cardiac silhouette and mediastinum are unremarkable. The bones are normal.     Slightly underexpanded lungs, otherwise no acute abnormality. Echocardiogram:  No results found for this or any previous visit.       Meds previously ordered:  Orders Placed This Encounter   Medications    0.9 % sodium chloride infusion    OR Linked Order Group     ondansetron (ZOFRAN-ODT) disintegrating tablet 4 mg     ondansetron (ZOFRAN) injection 4 mg    polyethylene glycol (GLYCOLAX) packet 17 g    DISCONTD: enoxaparin (LOVENOX) injection 40 mg     Order Specific Question:   Indication of Use     Answer:   Prophylaxis-DVT/PE    OR Linked Order Group     aspirin EC tablet 81 mg     aspirin suppository 300 mg    atorvastatin (LIPITOR) tablet 80 mg    labetalol (NORMODYNE;TRANDATE) injection 10 mg    glucose chewable tablet 16 g    OR Linked Order Group     dextrose bolus 10% 125 mL     dextrose bolus 10% 250 mL    glucagon (rDNA) injection 1 mg    dextrose 10 % infusion    insulin glargine (LANTUS) injection vial 40 Units    insulin lispro (HUMALOG) injection vial 0-16 Units    insulin lispro (HUMALOG) injection vial 0-4 Units    mometasone-formoterol (DULERA) 100-5 MCG/ACT inhaler 2 puff    albuterol sulfate HFA

## 2022-08-11 NOTE — PROGRESS NOTES
Hospitalist Progress Note   Admit Date:  8/10/2022  8:19 PM   Name:  Tracy August   Age:  73 y.o. Sex:  female  :  1955   MRN:  [id number]   Room:  [facility]    Presenting Complaint: right sided weakness    Reason(s) for Admission: Stroke-like symptoms [R29.90]     History of Present Illness:   Tracy August is a 73 y.o. female with medical history of obesity, T2DM, HTN, HLD, Asthma who presented to [facility] from home with confusion, drooling, right sided weakness that started acutely yesterday at 1800 and worsed today associated with trouble walking. Denied any trouble speaking. CTH w/o contrast showed no acute findings. Evaluated by tele-neuro with NIHSS 8 and ddx stroke, acute toxic./metabolic encephalopathy. She also c/o SOB, cough, congestion x2-3 days with intermittent wheezing. Has h/o asthma and has been using albuterol with some relief. She was found to be covid positive and spo2 86% on RA. She rec'd ASA 25 and IV decadron and transferred to  for further work up.       2022  Patient seen and evaluated. New patient for me today. Her  is at bedside  He states that he noted changes with her rt arm for about 2 weeks  Acutely worse yesterday  They deny any injury to the neck/head/back  Afebrile since admission    Review of Systems:  Unable to perform d/t AMS   Assessment & Plan: Active Problems:    Stroke-like symptoms  -Continue on remote telemetry  - TpA not indicated, presented outside of window  - loaded with  mg .   Continue ASA 81 mg and Plavix 75 mg daily   -C ontinue high intensity statin   - Fasting hgbA1C, Lipid panel, TSH, CMP, CBC, B12, UA   -We will get contrast studies tomorrow  - TTE with bubble  - Permissive hypertension to 220/120 mm Hg x 48 hours then goal normotensive with HCTZ +/- ACEi/ARB for strok ppx   - NS 1 L f/b  75 cc/hr x 48 hours   - Long term rhythm monitor at DC to eval for paroxysmal afib/flutter  - Outpatient referral to Neurology for followup  - if AFIB not detected on telemetry, loop vs 30 day cardiac monitoring. Acute renal failure superimposed on stage 3a chronic kidney disease (HCC)  Scr 1.87 (Bcr ~1)  Continue IV hydration   Continue to hold hold mobic, valsartan/hctz, verapamil   Maintain MAP >65 mm hg   Avoid anti-RAAS agents, nephrotoxic agents, and NSAIDs  Renal Dosing Strict I&O, Daily Weights, Daily BMP/CMP     Acute Hypoxemic Respiratory Failure 2/2 COVID-19   - s/p IV in ED. Decadron 6 mg x 9 doses in AM  -Continue O2: 3L [place] as able  -Encourage awake proning as tolerated, anti-tussive PRN, Bronchodilators     Acute encephalopathy - suspected 2/2 COVID and metabolic derangements. Possibly hypoxia. Hold morphine 15 mg BID, gabapentin 600 mg TID, and balocfen pending mentation improvement and renal recovery   8/11/2022  Resolving      Cervical disc disorder at C5-C6 level with radiculopathy // Degeneration of thoracolumbar intervertebral disc  Continue to hold morphine 15 mg BID, gabapentin 600 mg TID, and balocfen pending mentation improvement and renal recovery      HTN - hold home meds x 48 hours for perimissive HTN and hold arb until renal recovery      Mixed hyperlipidemia  Continue high intensity statin. Lipid panel in AM       Uncontrolled type 2 diabetes mellitus with hyperglycemia (HCC)  Continue glargine 20 U plus high dose SSI. A1c in AM.   Hold metformin. Check b12       Class 1 obesity due to excess calories with serious comorbidity and body mass index (BMI) of 31.0 to 31.9 in adult   adds to complexity. Lifestyle modifications. Mild intermittent asthma -continue LAMA-ICS, albuterol prn. Diabetic sensory polyneuropathy ([facility] [address]  Plan: glycemic control.  Check b12       Dispo/Discharge Planning:   TBD pending results of MRI  Diet: ADULT DIET; Easy to Chew  VTE ppx: lovenox   Code status: Full Code    Hospital Problems:  Principal Problem:    Stroke-like symptoms  Active Problems:    Cervical disc disorder at C5-C6 level with radiculopathy    Degeneration of thoracolumbar intervertebral disc    Mixed hyperlipidemia    Uncontrolled type 2 diabetes mellitus with hyperglycemia (HCC)    Class 1 obesity due to excess calories with serious comorbidity and body mass index (BMI) of 31.0 to 31.9 in adult    Acute hypoxemic respiratory failure due to COVID-19 [facility])    Acute renal failure superimposed on stage 3a chronic kidney disease (HCC)    Diabetic sensory polyneuropathy ([facility])  Resolved Problems:    * No resolved hospital problems. *       Past History:   Diagnosis Date   Abnormal ankle brachial index (ANDRE) 07/2019   Negative   Arthritis   Back pain   Displacement of cervical intervertebral disc   Hypertension   Hypertriglyceridemia   Plantar fasciitis   Thoracic degenerative disc disease   Thromboembolism (HCC)   Type 2 diabetes mellitus ([facility])   Vitamin D deficiency     Past Surgical History:   Past Surgical History:   Procedure Laterality Date   CHOLECYSTECTOMY   COLONOSCOPY 2009, 11/2015, 03/01/2021 2015-Dr Holli Zeng normal CE   HYSTERECTOMY   STRABISMUS SURGERY Left   when 21yr old   TONSILLECTOMY     Family History:   Family History   Problem Relation Age of Onset   Blindness Mother   Glaucoma Mother   Cataracts Mother   Diabetes Mother   Heart disease Mother   Hypertension Mother   Colon cancer Father   Heart disease Father   Hypertension Father   Diabetes Father   Colon polyps Sister   Diabetes Sister   Diabetes Sister   Colon polyps Brother   Colon cancer Brother   Diabetes Brother   Diabetes Brother   Colon cancer Maternal Grandmother   Colon cancer Maternal Grandfather   Retinal degeneration Neg Hx     Alcohol History: reports no history of alcohol use.     Past Medical History:   Diagnosis Date    Chronic right-sided low back pain with right-sided sciatica 8/24/2020    Diabetic polyneuropathy associated with type 2 diabetes mellitus ([facility]) 8/24/2020    Diabetic sensory polyneuropathy ([facility]) 11/18/2020    Hip pain 11/18/2020    Mild intermittent asthma      Social History     Tobacco Use    Smoking status: Never    Smokeless tobacco: Never   Substance Use Topics    Alcohol use: Never      Social History     Substance and Sexual Activity   Drug Use Never           There is no immunization history on file for this patient. Allergies   Allergen Reactions    Codeine Myalgia     Prior to Admit Medications:  Current Outpatient Medications   Medication Instructions    atorvastatin (LIPITOR) 10 mg, Oral, DAILY    baclofen (LIORESAL) 10 mg, Oral, PRN    Cholecalciferol 75 MCG (3000 UT) TABS Oral    gabapentin (NEURONTIN) 600 mg, Oral, 3 TIMES DAILY    meloxicam (MOBIC) 15 mg, Oral, DAILY    metFORMIN (GLUCOPHAGE) 1,000 mg, Oral, 2 TIMES DAILY WITH MEALS    MORPHINE SULFATE ER PO 15 mg, Oral, 2 times daily    valsartan-hydroCHLOROthiazide (DIOVAN-HCT) 320-12.5 MG per tablet 1 tablet, Oral, DAILY    verapamil (VERELAN) 240 mg, Oral, DAILY         Objective:   Patient Vitals for the past 24 hrs:   Temp Pulse Resp BP SpO2   08/11/22 0758 98.1 °F (36.7 °C) 87 19 134/81 96 %   08/11/22 0339 97.3 °F (36.3 °C) 79 16 124/82 97 %   08/10/22 2246 97.5 °F (36.4 °C) 67 16 111/68 96 %   08/10/22 2145 -- 84 16 -- 97 %   08/10/22 2010 98 °F (36.7 °C) 82 16 (!) 104/58 96 %         Oxygen Therapy  SpO2: 96 %  Pulse Oximeter Device Mode: Intermittent  Pulse Oximeter Device Location: Left, Finger  O2 Device: Nasal cannula  Skin Assessment: Clean, dry, & intact  FiO2 : 32 %  O2 Flow Rate (L/min): 3 L/min    Estimated body mass index is 31.15 kg/m² as calculated from the following:    Height as of 8/11/22: 5' 7\" (1.702 m). Weight as of this encounter: 198 lb 14.4 oz (90.2 kg). No intake or output data in the 24 hours ending 08/11/22 0803      Physical Exam:    Blood pressure 134/81, pulse 87, temperature 98.1 °F (36.7 °C), temperature source Oral, resp. rate 19, weight 198 lb 14.4 oz (90.2 kg), SpO2 96 %. General:    Obese. Ill appearing.  NAD Anion Gap 10 7.0 - 16.0 mmol/L    Glucose 70 65 - 100 mg/dL    BUN 39 (H) 7.0 - 18.0 MG/DL    Creatinine 1.87 (H) 0.6 - 1.0 MG/DL    GFR African American 35 (L) >60 ml/min/1.73m2    GFR Non- 29 (L) >60 ml/min/1.73m2    Calcium 9.3 8.3 - 10.4 MG/DL    Total Bilirubin 0.4 0.2 - 1.1 MG/DL    ALT 14 13.0 - 61.0 U/L    AST 20 15 - 37 U/L    Alk Phosphatase 92 45.0 - 117.0 U/L    Total Protein 7.4 6.4 - 8.2 g/dL    Albumin 4.4 3.2 - 4.6 g/dL    Globulin 3.0 2.3 - 3.5 g/dL    Albumin/Globulin Ratio 1.5     ETOH    Collection Time: 08/10/22  1:50 PM   Result Value Ref Range    Ethanol Lvl <10 (H) 0 MG/DL   Troponin T    Collection Time: 08/10/22  1:50 PM   Result Value Ref Range    Troponin T 16.6 (H) 0 - 14 ng/L   Brain Natriuretic Peptide    Collection Time: 08/10/22  1:50 PM   Result Value Ref Range    NT Pro- 0 - 450 PG/ML   COVID-19, Rapid    Collection Time: 08/10/22  2:22 PM    Specimen: Nasopharyngeal   Result Value Ref Range    Source Nasopharyngeal      SARS-CoV-2, Rapid Detected (A) NOTD     Rapid influenza A/B antigens    Collection Time: 08/10/22  2:22 PM    Specimen: Nasal Washing   Result Value Ref Range    Influenza A Ag Negative NEG      Influenza B Ag Negative NEG      Source Nasopharyngeal     Urine Drug Screen    Collection Time: 08/10/22  4:03 PM   Result Value Ref Range    PCP, Urine Negative NEG      Benzodiazepines, Urine Negative NEG      Cocaine, Urine Negative NEG      Amphetamine, Urine Negative NEG      Methadone, Urine Negative NEG      THC, TH-Cannabinol, Urine Negative NEG      Opiates, Urine Positive (A) NEG      Barbiturates, Urine Negative NEG     POCT Glucose    Collection Time: 08/10/22  8:32 PM   Result Value Ref Range    POC Glucose 297 (H) 65 - 100 mg/dL    Performed by: Pierre    Lipid panel - fasting    Collection Time: 08/11/22  4:40 AM   Result Value Ref Range    Cholesterol, Total 159 <200 MG/DL    Triglycerides 89 35 - 150 MG/DL    HDL 49 40 - 60 MG/DL    LDL Calculated 92.2 <100 MG/DL    VLDL Cholesterol Calculated 17.8 6.0 - 23.0 MG/DL    Chol/HDL Ratio 3.2     TSH with Reflex    Collection Time: 08/11/22  4:40 AM   Result Value Ref Range    TSH w Free Thyroid if Abnormal 0.29 (L) 0.358 - 3.740 UIU/ML   CBC with Auto Differential    Collection Time: 08/11/22  4:40 AM   Result Value Ref Range    WBC 6.2 4.3 - 11.1 K/uL    RBC 4.51 4.05 - 5.2 M/uL    Hemoglobin 12.2 11.7 - 15.4 g/dL    Hematocrit 38.5 35.8 - 46.3 %    MCV 85.4 79.6 - 97.8 FL    MCH 27.1 26.1 - 32.9 PG    MCHC 31.7 31.4 - 35.0 g/dL    RDW 13.0 11.9 - 14.6 %    Platelets 384 556 - 705 K/uL    MPV 11.1 9.4 - 12.3 FL    nRBC 0.00 0.0 - 0.2 K/uL    Differential Type AUTOMATED      Seg Neutrophils 82 (H) 43 - 78 %    Lymphocytes 15 13 - 44 %    Monocytes 2 (L) 4.0 - 12.0 %    Eosinophils % 0 (L) 0.5 - 7.8 %    Basophils 0 0.0 - 2.0 %    Immature Granulocytes 1 0.0 - 5.0 %    Segs Absolute 5.1 1.7 - 8.2 K/UL    Absolute Lymph # 0.9 0.5 - 4.6 K/UL    Absolute Mono # 0.2 0.1 - 1.3 K/UL    Absolute Eos # 0.0 0.0 - 0.8 K/UL    Basophils Absolute 0.0 0.0 - 0.2 K/UL    Absolute Immature Granulocyte 0.0 0.0 - 0.5 K/UL   Comprehensive Metabolic Panel    Collection Time: 08/11/22  4:40 AM   Result Value Ref Range    Sodium 136 136 - 145 mmol/L    Potassium 4.6 3.5 - 5.1 mmol/L    Chloride 102 98 - 107 mmol/L    CO2 28 21 - 32 mmol/L    Anion Gap 6 (L) 7 - 16 mmol/L    Glucose 260 (H) 65 - 100 mg/dL    BUN 35 (H) 8 - 23 MG/DL    Creatinine 1.40 (H) 0.6 - 1.0 MG/DL    GFR  49 (L) >60 ml/min/1.73m2    GFR Non- 40 (L) >60 ml/min/1.73m2    Calcium 8.9 8.3 - 10.4 MG/DL    Total Bilirubin 0.5 0.2 - 1.1 MG/DL    ALT 19 12 - 65 U/L    AST 16 15 - 37 U/L    Alk Phosphatase 97 50 - 136 U/L    Total Protein 7.4 6.3 - 8.2 g/dL    Albumin 3.6 3.2 - 4.6 g/dL    Globulin 3.8 (H) 2.3 - 3.5 g/dL    Albumin/Globulin Ratio 0.9 (L) 1.2 - 3.5     Magnesium    Collection Time: 08/11/22  4:40 AM Result Value Ref Range    Magnesium 2.0 1.8 - 2.4 mg/dL   Vitamin B12    Collection Time: 08/11/22  4:40 AM   Result Value Ref Range    Vitamin B-12 1314 (H) 193 - 986 pg/mL   Procalcitonin    Collection Time: 08/11/22  4:40 AM   Result Value Ref Range    Procalcitonin <0.05 0.00 - 0.49 ng/mL   D-Dimer, Quantitative    Collection Time: 08/11/22  4:40 AM   Result Value Ref Range    D-Dimer, Quant 0.53 <0.56 ug/ml(FEU)   C-Reactive Protein    Collection Time: 08/11/22  4:40 AM   Result Value Ref Range    CRP 1.0 (H) 0.0 - 0.9 mg/dL   Vitamin D 25 Hydroxy    Collection Time: 08/11/22  4:40 AM   Result Value Ref Range    Vit D, 25-Hydroxy 46.6 30.0 - 100.0 ng/mL       I have personally reviewed imaging studies showing:  CT Head W/O Contrast    Result Date: 8/10/2022  CT of the head without contrast. CLINICAL INDICATION: Right-sided weakness, code stroke PROCEDURE: Serial thin section axial images are obtained from the cranial vertex through the skull base without the administration of intravenous contrast. Radiation dose reduction techniques were used for this study. Our CT scanners use one or all of the following: Automated exposure control, adjusted of the mA and/or kV according to patient size, iterative reconstruction COMPARISON: No prior FINDINGS: There is no acute intracranial hemorrhage, mass, or mass effect. No abnormal extra-axial fluid collections identified. There is no hydrocephalus. The basilar cisterns are widely patent. The gray-white matter brain parenchymal interface is well-maintained. No skull fracture or aggressive osseous lesion noted. The mastoid air cells and included paranasal sinuses are clear. No acute intracranial abnormality. XR CHEST PORTABLE    Result Date: 8/10/2022  Portable chest x-ray Clinical indications: Cough and shortness of breath FINDINGS: Single AP view of the chest submitted without comparison show the lungs to be slightly underexpanded but otherwise clear.  No pleural effusion or pneumothorax. The cardiac silhouette and mediastinum are unremarkable. The bones are normal.     Slightly underexpanded lungs, otherwise no acute abnormality. Echocardiogram:  No results found for this or any previous visit. Meds previously ordered:  Orders Placed This Encounter   Medications    0.9 % sodium chloride infusion    OR Linked Order Group     ondansetron (ZOFRAN-ODT) disintegrating tablet 4 mg     ondansetron (ZOFRAN) injection 4 mg    polyethylene glycol (GLYCOLAX) packet 17 g    DISCONTD: enoxaparin (LOVENOX) injection 40 mg     Order Specific Question:   Indication of Use     Answer:   Prophylaxis-DVT/PE    OR Linked Order Group     aspirin EC tablet 81 mg     aspirin suppository 300 mg    atorvastatin (LIPITOR) tablet 80 mg    labetalol (NORMODYNE;TRANDATE) injection 10 mg    glucose chewable tablet 16 g    OR Linked Order Group     dextrose bolus 10% 125 mL     dextrose bolus 10% 250 mL    glucagon (rDNA) injection 1 mg    dextrose 10 % infusion    insulin glargine (LANTUS) injection vial 40 Units    insulin lispro (HUMALOG) injection vial 0-16 Units    insulin lispro (HUMALOG) injection vial 0-4 Units    mometasone-formoterol (DULERA) 100-5 MCG/ACT inhaler 2 puff    albuterol sulfate HFA (PROVENTIL;VENTOLIN;PROAIR) 108 (90 Base) MCG/ACT inhaler 2 puff     Order Specific Question:   Initiate RT Bronchodilator Protocol     Answer:   Yes - Inpatient Protocol    0.9 % sodium chloride bolus    heparin (porcine) injection 5,000 Units    dexamethasone (DECADRON) tablet 6 mg         Signed:  Clover Chisholm MD    Part of this note may have been written by using a voice dictation software. The note has been proof read but may still contain some grammatical/other typographical errors.

## 2022-08-11 NOTE — PROGRESS NOTES
Patient placed on remote telemetry per orders, box #2621, per monitor room patient is showing sinus rhythm at 62, will continue to monitor.

## 2022-08-12 ENCOUNTER — APPOINTMENT (OUTPATIENT)
Dept: MRI IMAGING | Age: 67
DRG: 177 | End: 2022-08-12
Attending: FAMILY MEDICINE
Payer: MEDICARE

## 2022-08-12 LAB
ANION GAP SERPL CALC-SCNC: 3 MMOL/L (ref 7–16)
BASOPHILS # BLD: 0 K/UL (ref 0–0.2)
BASOPHILS NFR BLD: 0 % (ref 0–2)
BUN SERPL-MCNC: 18 MG/DL (ref 8–23)
C DIFF GDH STL QL: ABNORMAL
C DIFF TOX A+B STL QL IA: ABNORMAL
C DIFF TOXIN INTERPRETATION: ABNORMAL
CALCIUM SERPL-MCNC: 8.5 MG/DL (ref 8.3–10.4)
CHLORIDE SERPL-SCNC: 104 MMOL/L (ref 98–107)
CLINICAL CONSIDERATION: ABNORMAL
CO2 SERPL-SCNC: 30 MMOL/L (ref 21–32)
CREAT SERPL-MCNC: 0.9 MG/DL (ref 0.6–1)
DIFFERENTIAL METHOD BLD: ABNORMAL
EOSINOPHIL # BLD: 0 K/UL (ref 0–0.8)
EOSINOPHIL NFR BLD: 0 % (ref 0.5–7.8)
ERYTHROCYTE [DISTWIDTH] IN BLOOD BY AUTOMATED COUNT: 12.8 % (ref 11.9–14.6)
EST. AVERAGE GLUCOSE BLD GHB EST-MCNC: 160 MG/DL
GLUCOSE BLD STRIP.AUTO-MCNC: 121 MG/DL (ref 65–100)
GLUCOSE BLD STRIP.AUTO-MCNC: 167 MG/DL (ref 65–100)
GLUCOSE BLD STRIP.AUTO-MCNC: 174 MG/DL (ref 65–100)
GLUCOSE BLD STRIP.AUTO-MCNC: 268 MG/DL (ref 65–100)
GLUCOSE SERPL-MCNC: 141 MG/DL (ref 65–100)
HBA1C MFR BLD: 7.2 % (ref 4.8–5.6)
HCT VFR BLD AUTO: 33.1 % (ref 35.8–46.3)
HGB BLD-MCNC: 11 G/DL (ref 11.7–15.4)
IMM GRANULOCYTES # BLD AUTO: 0 K/UL (ref 0–0.5)
IMM GRANULOCYTES NFR BLD AUTO: 0 % (ref 0–5)
LYMPHOCYTES # BLD: 0.8 K/UL (ref 0.5–4.6)
LYMPHOCYTES NFR BLD: 9 % (ref 13–44)
MAGNESIUM SERPL-MCNC: 1.7 MG/DL (ref 1.8–2.4)
MCH RBC QN AUTO: 27.3 PG (ref 26.1–32.9)
MCHC RBC AUTO-ENTMCNC: 33.2 G/DL (ref 31.4–35)
MCV RBC AUTO: 82.1 FL (ref 79.6–97.8)
MONOCYTES # BLD: 0.5 K/UL (ref 0.1–1.3)
MONOCYTES NFR BLD: 5 % (ref 4–12)
NEUTS SEG # BLD: 8 K/UL (ref 1.7–8.2)
NEUTS SEG NFR BLD: 86 % (ref 43–78)
NRBC # BLD: 0 K/UL (ref 0–0.2)
PLATELET # BLD AUTO: 243 K/UL (ref 150–450)
PMV BLD AUTO: 10.5 FL (ref 9.4–12.3)
POTASSIUM SERPL-SCNC: 3.3 MMOL/L (ref 3.5–5.1)
RBC # BLD AUTO: 4.03 M/UL (ref 4.05–5.2)
REFLEX: ABNORMAL
SERVICE CMNT-IMP: ABNORMAL
SODIUM SERPL-SCNC: 137 MMOL/L (ref 136–145)
WBC # BLD AUTO: 9.3 K/UL (ref 4.3–11.1)
WBC #/AREA STL HPF: NORMAL /HPF (ref 0–4)

## 2022-08-12 PROCEDURE — 6360000004 HC RX CONTRAST MEDICATION: Performed by: FAMILY MEDICINE

## 2022-08-12 PROCEDURE — G0378 HOSPITAL OBSERVATION PER HR: HCPCS

## 2022-08-12 PROCEDURE — 70553 MRI BRAIN STEM W/O & W/DYE: CPT

## 2022-08-12 PROCEDURE — 6360000002 HC RX W HCPCS: Performed by: INTERNAL MEDICINE

## 2022-08-12 PROCEDURE — 6360000002 HC RX W HCPCS: Performed by: FAMILY MEDICINE

## 2022-08-12 PROCEDURE — A9579 GAD-BASE MR CONTRAST NOS,1ML: HCPCS | Performed by: FAMILY MEDICINE

## 2022-08-12 PROCEDURE — 87177 OVA AND PARASITES SMEARS: CPT

## 2022-08-12 PROCEDURE — 80048 BASIC METABOLIC PNL TOTAL CA: CPT

## 2022-08-12 PROCEDURE — 97530 THERAPEUTIC ACTIVITIES: CPT

## 2022-08-12 PROCEDURE — 96375 TX/PRO/DX INJ NEW DRUG ADDON: CPT

## 2022-08-12 PROCEDURE — 6370000000 HC RX 637 (ALT 250 FOR IP): Performed by: INTERNAL MEDICINE

## 2022-08-12 PROCEDURE — 36415 COLL VENOUS BLD VENIPUNCTURE: CPT

## 2022-08-12 PROCEDURE — 6370000000 HC RX 637 (ALT 250 FOR IP): Performed by: FAMILY MEDICINE

## 2022-08-12 PROCEDURE — 99358 PROLONG SERVICE W/O CONTACT: CPT | Performed by: PHYSICAL MEDICINE & REHABILITATION

## 2022-08-12 PROCEDURE — 94760 N-INVAS EAR/PLS OXIMETRY 1: CPT

## 2022-08-12 PROCEDURE — 83735 ASSAY OF MAGNESIUM: CPT

## 2022-08-12 PROCEDURE — 1100000000 HC RM PRIVATE

## 2022-08-12 PROCEDURE — 89055 LEUKOCYTE ASSESSMENT FECAL: CPT

## 2022-08-12 PROCEDURE — 87449 NOS EACH ORGANISM AG IA: CPT

## 2022-08-12 PROCEDURE — 96372 THER/PROPH/DIAG INJ SC/IM: CPT

## 2022-08-12 PROCEDURE — 2580000003 HC RX 258: Performed by: FAMILY MEDICINE

## 2022-08-12 PROCEDURE — 92526 ORAL FUNCTION THERAPY: CPT

## 2022-08-12 PROCEDURE — 2700000000 HC OXYGEN THERAPY PER DAY

## 2022-08-12 PROCEDURE — 96366 THER/PROPH/DIAG IV INF ADDON: CPT

## 2022-08-12 PROCEDURE — 2580000003 HC RX 258: Performed by: INTERNAL MEDICINE

## 2022-08-12 PROCEDURE — 70544 MR ANGIOGRAPHY HEAD W/O DYE: CPT

## 2022-08-12 PROCEDURE — 85025 COMPLETE CBC W/AUTO DIFF WBC: CPT

## 2022-08-12 PROCEDURE — 70548 MR ANGIOGRAPHY NECK W/DYE: CPT

## 2022-08-12 PROCEDURE — 94640 AIRWAY INHALATION TREATMENT: CPT

## 2022-08-12 PROCEDURE — 82962 GLUCOSE BLOOD TEST: CPT

## 2022-08-12 RX ORDER — GABAPENTIN 300 MG/1
300 CAPSULE ORAL 3 TIMES DAILY
Status: DISCONTINUED | OUTPATIENT
Start: 2022-08-12 | End: 2022-08-13 | Stop reason: HOSPADM

## 2022-08-12 RX ORDER — PROMETHAZINE HYDROCHLORIDE 25 MG/ML
12.5 INJECTION, SOLUTION INTRAMUSCULAR; INTRAVENOUS EVERY 6 HOURS PRN
Status: DISCONTINUED | OUTPATIENT
Start: 2022-08-12 | End: 2022-08-13 | Stop reason: HOSPADM

## 2022-08-12 RX ORDER — ONDANSETRON 2 MG/ML
8 INJECTION INTRAMUSCULAR; INTRAVENOUS EVERY 6 HOURS PRN
Status: DISCONTINUED | OUTPATIENT
Start: 2022-08-12 | End: 2022-08-13 | Stop reason: HOSPADM

## 2022-08-12 RX ORDER — SODIUM CHLORIDE 0.9 % (FLUSH) 0.9 %
10 SYRINGE (ML) INJECTION AS NEEDED
Status: DISCONTINUED | OUTPATIENT
Start: 2022-08-12 | End: 2022-08-13 | Stop reason: HOSPADM

## 2022-08-12 RX ORDER — ONDANSETRON 4 MG/1
8 TABLET, ORALLY DISINTEGRATING ORAL EVERY 8 HOURS PRN
Status: DISCONTINUED | OUTPATIENT
Start: 2022-08-12 | End: 2022-08-13 | Stop reason: HOSPADM

## 2022-08-12 RX ORDER — MAGNESIUM SULFATE IN WATER 40 MG/ML
2000 INJECTION, SOLUTION INTRAVENOUS ONCE
Status: COMPLETED | OUTPATIENT
Start: 2022-08-12 | End: 2022-08-12

## 2022-08-12 RX ORDER — DIAZEPAM 5 MG/ML
5 INJECTION, SOLUTION INTRAMUSCULAR; INTRAVENOUS PRN
Status: COMPLETED | OUTPATIENT
Start: 2022-08-12 | End: 2022-08-12

## 2022-08-12 RX ORDER — SODIUM CHLORIDE 9 MG/ML
INJECTION, SOLUTION INTRAVENOUS CONTINUOUS
Status: DISCONTINUED | OUTPATIENT
Start: 2022-08-12 | End: 2022-08-13 | Stop reason: HOSPADM

## 2022-08-12 RX ORDER — MORPHINE SULFATE 15 MG/1
15 TABLET, FILM COATED, EXTENDED RELEASE ORAL 2 TIMES DAILY
Status: DISCONTINUED | OUTPATIENT
Start: 2022-08-12 | End: 2022-08-13 | Stop reason: HOSPADM

## 2022-08-12 RX ORDER — POTASSIUM CHLORIDE 20 MEQ/1
40 TABLET, EXTENDED RELEASE ORAL 2 TIMES DAILY WITH MEALS
Status: DISCONTINUED | OUTPATIENT
Start: 2022-08-12 | End: 2022-08-13 | Stop reason: HOSPADM

## 2022-08-12 RX ADMIN — ATORVASTATIN CALCIUM 80 MG: 80 TABLET, FILM COATED ORAL at 21:54

## 2022-08-12 RX ADMIN — HEPARIN SODIUM 5000 UNITS: 5000 INJECTION INTRAVENOUS; SUBCUTANEOUS at 13:47

## 2022-08-12 RX ADMIN — HEPARIN SODIUM 5000 UNITS: 5000 INJECTION INTRAVENOUS; SUBCUTANEOUS at 21:54

## 2022-08-12 RX ADMIN — DEXAMETHASONE 6 MG: 4 TABLET ORAL at 07:55

## 2022-08-12 RX ADMIN — GADOTERIDOL 20 ML: 279.3 INJECTION, SOLUTION INTRAVENOUS at 16:32

## 2022-08-12 RX ADMIN — VANCOMYCIN HYDROCHLORIDE 125 MG: 5 INJECTION, POWDER, LYOPHILIZED, FOR SOLUTION INTRAVENOUS at 17:57

## 2022-08-12 RX ADMIN — GABAPENTIN 300 MG: 300 CAPSULE ORAL at 21:54

## 2022-08-12 RX ADMIN — MAGNESIUM SULFATE HEPTAHYDRATE 2000 MG: 40 INJECTION, SOLUTION INTRAVENOUS at 17:42

## 2022-08-12 RX ADMIN — MOMETASONE FUROATE AND FORMOTEROL FUMARATE DIHYDRATE 2 PUFF: 100; 5 AEROSOL RESPIRATORY (INHALATION) at 08:13

## 2022-08-12 RX ADMIN — GABAPENTIN 300 MG: 300 CAPSULE ORAL at 17:42

## 2022-08-12 RX ADMIN — SODIUM CHLORIDE: 9 INJECTION, SOLUTION INTRAVENOUS at 09:01

## 2022-08-12 RX ADMIN — ASPIRIN 81 MG: 81 TABLET ORAL at 07:55

## 2022-08-12 RX ADMIN — MORPHINE SULFATE 15 MG: 15 TABLET, FILM COATED, EXTENDED RELEASE ORAL at 22:48

## 2022-08-12 RX ADMIN — DIAZEPAM 5 MG: 10 INJECTION, SOLUTION INTRAMUSCULAR; INTRAVENOUS at 14:46

## 2022-08-12 RX ADMIN — CLOPIDOGREL BISULFATE 75 MG: 75 TABLET ORAL at 07:54

## 2022-08-12 RX ADMIN — MOMETASONE FUROATE AND FORMOTEROL FUMARATE DIHYDRATE 2 PUFF: 100; 5 AEROSOL RESPIRATORY (INHALATION) at 20:34

## 2022-08-12 RX ADMIN — INSULIN LISPRO 8 UNITS: 100 INJECTION, SOLUTION INTRAVENOUS; SUBCUTANEOUS at 17:55

## 2022-08-12 RX ADMIN — ONDANSETRON 4 MG: 2 INJECTION INTRAMUSCULAR; INTRAVENOUS at 06:27

## 2022-08-12 RX ADMIN — SODIUM CHLORIDE, PRESERVATIVE FREE 10 ML: 5 INJECTION INTRAVENOUS at 16:32

## 2022-08-12 RX ADMIN — INSULIN GLARGINE 40 UNITS: 100 INJECTION, SOLUTION SUBCUTANEOUS at 21:40

## 2022-08-12 RX ADMIN — POTASSIUM CHLORIDE 40 MEQ: 1500 TABLET, EXTENDED RELEASE ORAL at 17:42

## 2022-08-12 RX ADMIN — HEPARIN SODIUM 5000 UNITS: 5000 INJECTION INTRAVENOUS; SUBCUTANEOUS at 06:12

## 2022-08-12 RX ADMIN — SODIUM CHLORIDE: 9 INJECTION, SOLUTION INTRAVENOUS at 22:50

## 2022-08-12 RX ADMIN — VANCOMYCIN HYDROCHLORIDE 125 MG: 5 INJECTION, POWDER, LYOPHILIZED, FOR SOLUTION INTRAVENOUS at 23:24

## 2022-08-12 ASSESSMENT — PAIN SCALES - GENERAL
PAINLEVEL_OUTOF10: 0
PAINLEVEL_OUTOF10: 0

## 2022-08-12 NOTE — PROGRESS NOTES
CM made an attempt to see patient, patient laying in bed asleep. CM reviewed patient medical chart and completed assessment. PT has recommended inpatient rehab and spouse has encouraged patient to participant in rehab and she agreeable. Referral made to the 9th floor for inpatient rehab. CM will continue to follow and remain available for any needs or concerns that may occur. 08/11/22 2347   Service Assessment   Patient Orientation Alert and Oriented;Person;Situation;Self;Place   Cognition Alert   Primary Caregiver Self   Support Systems Spouse/Significant Other   Patient's Healthcare Decision Maker is: Legal Next of Kin   PCP Verified by CM Yes   Prior Functional Level Independent in ADLs/IADLs   Current Functional Level Assistance with the following:;Dressing; Bathing; Toileting;Cooking;Housework   Can patient return to prior living arrangement Yes   Ability to make needs known: Good   Family able to assist with home care needs: Yes   Would you like for me to discuss the discharge plan with any other family members/significant others, and if so, who? Yes   Social/Functional History   Lives With Spouse   Type of [address] [facility] One level   [facility] [address] to enter with rails   Entrance Stairs - Number of Steps 1   Entrance Stairs - Rails Both   Bathroom Shower/Tub Tub/Shower unit; Walk-in shower   Bathroom Toilet [address] rolling   Receives Help From Family   ADL Assistance Independent   Homemaking Assistance Independent   Homemaking Responsibilities Yes   Ambulation Assistance Independent   Transfer Assistance Independent   Active  No   Mode of Transportation Car   Discharge Planning   Type of Residence Acute Rehab   Living Arrangements Spouse/Significant Other   Current Services Prior To Admission Home Care;None   Potential Assistance Needed N/A   Potential Assistance Purchasing Medications No   Patient expects to be discharged to: Acute rehab One/Two Story Residence One story   History of falls? 0   Services At/After Discharge   [employer] Information Provided?  No

## 2022-08-12 NOTE — PROGRESS NOTES
Patient has been observed during hourly rounds and she has slept well and quietly. She just got up to use BSC, and while sitting on BSC she had an episode of vomiting. Patient had large amount of emesis, and was cleaned up and returned to bed,  Patient was medicated with Zofran 4 mg IV per orders. Will prepare to give report to oncoming nurse, and will monitor and assist as needed.

## 2022-08-12 NOTE — PROGRESS NOTES
LTG: patient will tolerate safest, least restrictive oral diet without s/sx airway compromise. MET   STG: Patient will tolerate easy to chew diet and thin liquids without overt s/sx aspiration. MET   STG: Patient will participate in modified barium swallow study to objectively assess oropharyngeal swallow if indicated. Discontinued      SPEECH LANGUAGE PATHOLOGY: DYSPHAGIA  Daily Note #1    NAME: Melissa Lynch  : 1955  MRN: [id number]    ADMISSION DATE: 8/10/2022  PRIMARY DIAGNOSIS: Stroke-like symptoms  Stroke-like symptoms [R29.90]    ICD-10: Treatment Diagnosis: R13.11 Dysphagia, Oral Phase    RECOMMENDATIONS   Diet:  Diet Solids Recommendation: Easy to Chew  Liquid Consistency Recommendation: Thin    Medications: PO     Compensatory Swallowing Strategies:Eat/Feed slowly;Upright as possible for all oral intake;Remain upright for 30-45 minutes after meals;Small bites/sips; Set up assist   Therapeutic Intervention:Patient/Family education   Patient continues to require skilled intervention: Yes  D/C Recommendations: Ongoing speech therapy is recommended during this hospitalization (pending MRI, CVA workup)     ASSESSMENT    Dysphagia Diagnosis: Mild oral stage dysphagia but functional with increased time. No overt s/sx pharyngeal dysphagia. Dysphagia goals met. Will follow along to determine if cognitive linguistic evaluation warranted pending MRI findings. GENERAL    History of Present Injury/Illness: Ms. Blas Tracey  has a past medical history of Chronic right-sided low back pain with right-sided sciatica, Diabetic polyneuropathy associated with type 2 diabetes mellitus ([address] Diabetic sensory polyneuropathy ([address] Hip pain, and Mild intermittent asthma. . She also  has a past surgical history that includes Hysterectomy; Cholecystectomy; and Colonoscopy. Chart Reviewed: Yes  General Comment  Comments: reports nausea and vomiting today when stood up to use BSC.  reports tolerated breakfast after that event. Subjective: alert sitting edge of bed  Behavior/Cognition: Cooperative;Pleasant mood  Communication Observation: Functional (reports speech at baseline. denies difficulty with word finding)  Follows Directions: Simple        O2 Device: Nasal cannula  Liters of Oxygen: 3 L     Prior Dysphagia History: patient denies difficulty with easy to chew meal tray. Current Diet : Easy to chew  Current Liquid Diet : Thin  Pain:   Patient does not c/o pain                                        OBJECTIVE    Patient Positioning: Upright in bed   Oral Motor   Labial: No impairment  Oral Hygiene: Moist;Clean  Lingual: No impairment  Dentition: Some missing teeth (has upper partial)        Baseline Vocal Quality: Normal  Oropharyngeal Phase:     Assessment Method(s): Observation;Palpation  Consistency Presented: Thin;Mixed consistency  How Presented: Self-fed/presented;Straw;Spoon; Successive Swallows  Bolus Acceptance: No impairment  Type of Impairment: Mastication (mildly increased but functional)  Propulsion: No impairment  Oral Residue: None  Initiation of Swallow: No impairment  Laryngeal Elevation: Functional  Aspiration Signs/Symptoms: None  Pharyngeal Phase Characteristics: No impairment, issues, or problems; voice clear; 1-2 swallows per bite/sip without s/sx of reduced pharyngeal clearance. PLAN    Duration/Frequency: SLP will follow up for cognitive linguistic evaluation if indicated by imaging or course of hospitalization. Frequency/Duration TBD. Dysphagia Outcome and Severity Scale (SHEYLA)  Dysphagia Outcome Severity Scale: Level 6: Within functional limits/Modified independence  Interpretation of Tool: The Dysphagia Outcome and Severity Scale (SHEYLA) is a simple, easy-to-use, 7-point scale developed to systematically rate the functional severity of dysphagia based on objective assessment and make recommendations for diet level, independence level, and type of nutrition.    Normal(7), Functional(6), Mild(5), Mild-Moderate(4), Moderate(3), Moderate-Severe(2), Severe(1)    Speech Therapy Prognosis  Prognosis: Excellent    Education: Patient  Patient Education: diet consistencies, aspiration precautions, role of SLP  Patient Education Response: Demonstrated understanding, Verbalizes understanding    Current Medications:   No current facility-administered medications on file prior to encounter. Current Outpatient Medications on File Prior to Encounter   Medication Sig Dispense Refill    MORPHINE SULFATE ER PO Take 15 mg by mouth in the morning and at bedtime      atorvastatin (LIPITOR) 10 MG tablet Take 10 mg by mouth daily      baclofen (LIORESAL) 10 MG tablet Take 10 mg by mouth as needed (Patient not taking: No sig reported)      Cholecalciferol 75 MCG (3000 UT) TABS Take by mouth      gabapentin (NEURONTIN) 600 MG tablet Take 600 mg by mouth 3 times daily. meloxicam (MOBIC) 15 MG tablet Take 15 mg by mouth daily      metFORMIN (GLUCOPHAGE) 1000 MG tablet Take 1,000 mg by mouth in the morning and 1,000 mg at noon and 1,000 mg in the evening. Take with meals.  Per Patient List.      valsartan-hydroCHLOROthiazide (DIOVAN-HCT) 320-12.5 MG per tablet Take 1 tablet by mouth daily      verapamil (VERELAN) 240 MG extended release capsule Take 240 mg by mouth daily         PRECAUTIONS/ALLERGIES: Codeine   Safety Devices in place: Yes  Type of devices: Left in bed, Call light within reach    Therapy Time  SLP Individual Minutes  Time In: 9109  Time Out: 38  Minutes: Dori [address] [place] [address]  8/12/2022 9:00 AM

## 2022-08-12 NOTE — PROGRESS NOTES
Reviewed notes for spiritual concerns    NOTED:      COVID     STROKE LIKE SYMPTOMS     DM 2     OBESITY                Will follow as needed

## 2022-08-12 NOTE — CONSULTS
Physical Medicine & Rehabilitation Consult    Subjective:     Date of Consultation:  August 12, 2022  Referring Provider: Dr Soraida Gerardo is a 73 y.o. female who is being evaluated at the request of the hospitalist service for consideration for inpatient rehabilitation following admission with stroke-like symptoms     HPI: Humphrey Service is a 73 y.o. female with medical history of obesity, T2DM, HTN, HLD, Asthma who presented to [facility] from home with confusion, drooling, right sided weakness that started acutely yesterday at 1800 and worsed today associated with trouble walking. Denied any trouble speaking. CTH w/o contrast showed no acute findings. Evaluated by tele-neuro with NIHSS 8 and ddx stroke, acute toxic./metabolic encephalopathy. She also c/o SOB, cough, congestion x2-3 days with intermittent wheezing. Has h/o asthma and has been using albuterol with some relief. She was found to be covid positive and spo2 86% on RA. She rec'd ASA 25 and IV decadron and transferred to  for further work up. \"  Therapies have recommended [facility] based on their evaluations. OT;\"Completed bed mobility, LB dressing, sit>stand, ambulation without AD, and grooming tasks standing at the sink with overall Jeffery. Pt noted to have coordination deficits with both hands and decreased fine motor skills--reported she is dropping her utensils when trying to self-feeding.  Pt also noted to have delayed processing and difficulty following commands without verbal/tactile cues\"  PT;SBA for bed mobility, CGA STS and transfers, ambulation 60ft min assist, no assisted device    Principal Problem:    Stroke-like symptoms  Active Problems:    Cervical disc disorder at C5-C6 level with radiculopathy    Degeneration of thoracolumbar intervertebral disc    Mixed hyperlipidemia    Uncontrolled type 2 diabetes mellitus with hyperglycemia ([facility] [address]    Class 1 obesity due to excess calories with serious comorbidity and body morning and 1,000 mg at noon and 1,000 mg in the evening. Take with meals. Per Patient List.    Ar Automatic Reconciliation   valsartan-hydroCHLOROthiazide (DIOVAN-HCT) 320-12.5 MG per tablet Take 1 tablet by mouth daily    Ar Automatic Reconciliation   verapamil (VERELAN) 240 MG extended release capsule Take 240 mg by mouth daily    Ar Automatic Reconciliation     Allergies   Allergen Reactions    Codeine Myalgia        Objective:     Vitals:  Blood pressure (!) 140/90, pulse 73, temperature 97.9 °F (36.6 °C), resp. rate 16, height 5' 7\" (1.702 m), weight 198 lb (89.8 kg), SpO2 96 %.   Temp (24hrs), Av.7 °F (36.5 °C), Min:97.3 °F (36.3 °C), Max:98.1 °F (36.7 °C)      Labs/Studies:  Recent Results (from the past 72 hour(s))   POCT Glucose    Collection Time: 08/10/22  1:20 PM   Result Value Ref Range    POC Glucose 76 65 - 100 mg/dL    Performed by: Va    Blood Culture 1    Collection Time: 08/10/22  1:50 PM    Specimen: Blood   Result Value Ref Range    Special Requests RAC     Culture NO GROWTH AFTER 18 HOURS     CBC with Auto Differential    Collection Time: 08/10/22  1:50 PM   Result Value Ref Range    WBC 8.1 4.3 - 11.1 K/uL    RBC 4.21 4.05 - 5.20 M/uL    Hemoglobin 11.5 (L) 11.7 - 15.4 g/dL    Hematocrit 35.8 35.8 - 46.3 %    MCV 85.0 79.6 - 97.8 FL    MCH 27.3 26.1 - 32.9 PG    MCHC 32.1 31.4 - 35.0 g/dL    RDW 13.0 11.9 - 14.6 %    Platelets 401 834 - 905 K/uL    MPV 10.6 9.4 - 12.3 FL    nRBC 0.00 0.0 - 0.2 K/uL    Differential Type AUTOMATED      Seg Neutrophils 62 43 - 78 %    Lymphocytes 28 13 - 44 %    Monocytes 8 4.0 - 12.0 %    Eosinophils % 2 0.5 - 7.8 %    Basophils 0 0.0 - 2.0 %    Immature Granulocytes 0 0.0 - 5.0 %    Segs Absolute 5.0 1.7 - 8.2 K/UL    Absolute Lymph # 2.2 0.5 - 4.6 K/UL    Absolute Mono # 0.6 0.1 - 1.3 K/UL    Absolute Eos # 0.2 0.0 - 0.8 K/UL    Basophils Absolute 0.0 0.0 - 0.2 K/UL    Absolute Immature Granulocyte 0.0 0.0 - 0.5 K/UL   CMP    Collection Time: 08/10/22  1:50 PM   Result Value Ref Range    Sodium 136 136 - 145 mmol/L    Potassium 3.8 3.5 - 5.1 mmol/L    Chloride 94 (L) 98 - 107 mmol/L    CO2 32 21 - 32 mmol/L    Anion Gap 10 7.0 - 16.0 mmol/L    Glucose 70 65 - 100 mg/dL    BUN 39 (H) 7.0 - 18.0 MG/DL    Creatinine 1.87 (H) 0.6 - 1.0 MG/DL    GFR African American 35 (L) >60 ml/min/1.73m2    GFR Non- 29 (L) >60 ml/min/1.73m2    Calcium 9.3 8.3 - 10.4 MG/DL    Total Bilirubin 0.4 0.2 - 1.1 MG/DL    ALT 14 13.0 - 61.0 U/L    AST 20 15 - 37 U/L    Alk Phosphatase 92 45.0 - 117.0 U/L    Total Protein 7.4 6.4 - 8.2 g/dL    Albumin 4.4 3.2 - 4.6 g/dL    Globulin 3.0 2.3 - 3.5 g/dL    Albumin/Globulin Ratio 1.5     ETOH    Collection Time: 08/10/22  1:50 PM   Result Value Ref Range    Ethanol Lvl <10 (H) 0 MG/DL   Troponin T    Collection Time: 08/10/22  1:50 PM   Result Value Ref Range    Troponin T 16.6 (H) 0 - 14 ng/L   Brain Natriuretic Peptide    Collection Time: 08/10/22  1:50 PM   Result Value Ref Range    NT Pro- 0 - 450 PG/ML   COVID-19, Rapid    Collection Time: 08/10/22  2:22 PM    Specimen: Nasopharyngeal   Result Value Ref Range    Source Nasopharyngeal      SARS-CoV-2, Rapid Detected (A) NOTD     Rapid influenza A/B antigens    Collection Time: 08/10/22  2:22 PM    Specimen: Nasal Washing   Result Value Ref Range    Influenza A Ag Negative NEG      Influenza B Ag Negative NEG      Source Nasopharyngeal     Urine Drug Screen    Collection Time: 08/10/22  4:03 PM   Result Value Ref Range    PCP, Urine Negative NEG      Benzodiazepines, Urine Negative NEG      Cocaine, Urine Negative NEG      Amphetamine, Urine Negative NEG      Methadone, Urine Negative NEG      THC, TH-Cannabinol, Urine Negative NEG      Opiates, Urine Positive (A) NEG      Barbiturates, Urine Negative NEG     Blood Culture 2    Collection Time: 08/10/22  4:28 PM    Specimen: Blood   Result Value Ref Range    Special Requests LAC     Culture NO GROWTH AFTER 13 HOURS     POCT Glucose    Collection Time: 08/10/22  8:32 PM   Result Value Ref Range    POC Glucose 297 (H) 65 - 100 mg/dL    Performed by: Pierre    Hemoglobin A1c    Collection Time: 08/11/22  4:40 AM   Result Value Ref Range    Hemoglobin A1C 7.2 (H) 4.8 - 5.6 %    eAG 160 mg/dL   Lipid panel - fasting    Collection Time: 08/11/22  4:40 AM   Result Value Ref Range    Cholesterol, Total 159 <200 MG/DL    Triglycerides 89 35 - 150 MG/DL    HDL 49 40 - 60 MG/DL    LDL Calculated 92.2 <100 MG/DL    VLDL Cholesterol Calculated 17.8 6.0 - 23.0 MG/DL    Chol/HDL Ratio 3.2     TSH with Reflex    Collection Time: 08/11/22  4:40 AM   Result Value Ref Range    TSH w Free Thyroid if Abnormal 0.29 (L) 0.358 - 3.740 UIU/ML   CBC with Auto Differential    Collection Time: 08/11/22  4:40 AM   Result Value Ref Range    WBC 6.2 4.3 - 11.1 K/uL    RBC 4.51 4.05 - 5.2 M/uL    Hemoglobin 12.2 11.7 - 15.4 g/dL    Hematocrit 38.5 35.8 - 46.3 %    MCV 85.4 79.6 - 97.8 FL    MCH 27.1 26.1 - 32.9 PG    MCHC 31.7 31.4 - 35.0 g/dL    RDW 13.0 11.9 - 14.6 %    Platelets 322 202 - 003 K/uL    MPV 11.1 9.4 - 12.3 FL    nRBC 0.00 0.0 - 0.2 K/uL    Differential Type AUTOMATED      Seg Neutrophils 82 (H) 43 - 78 %    Lymphocytes 15 13 - 44 %    Monocytes 2 (L) 4.0 - 12.0 %    Eosinophils % 0 (L) 0.5 - 7.8 %    Basophils 0 0.0 - 2.0 %    Immature Granulocytes 1 0.0 - 5.0 %    Segs Absolute 5.1 1.7 - 8.2 K/UL    Absolute Lymph # 0.9 0.5 - 4.6 K/UL    Absolute Mono # 0.2 0.1 - 1.3 K/UL    Absolute Eos # 0.0 0.0 - 0.8 K/UL    Basophils Absolute 0.0 0.0 - 0.2 K/UL    Absolute Immature Granulocyte 0.0 0.0 - 0.5 K/UL   Comprehensive Metabolic Panel    Collection Time: 08/11/22  4:40 AM   Result Value Ref Range    Sodium 136 136 - 145 mmol/L    Potassium 4.6 3.5 - 5.1 mmol/L    Chloride 102 98 - 107 mmol/L    CO2 28 21 - 32 mmol/L    Anion Gap 6 (L) 7 - 16 mmol/L    Glucose 260 (H) 65 - 100 mg/dL    BUN 35 (H) 8 - 23 MG/DL    Creatinine 1.40 (H) 0.6 - 1.0 MG/DL    GFR  49 (L) >60 ml/min/1.73m2    GFR Non- 40 (L) >60 ml/min/1.73m2    Calcium 8.9 8.3 - 10.4 MG/DL    Total Bilirubin 0.5 0.2 - 1.1 MG/DL    ALT 19 12 - 65 U/L    AST 16 15 - 37 U/L    Alk Phosphatase 97 50 - 136 U/L    Total Protein 7.4 6.3 - 8.2 g/dL    Albumin 3.6 3.2 - 4.6 g/dL    Globulin 3.8 (H) 2.3 - 3.5 g/dL    Albumin/Globulin Ratio 0.9 (L) 1.2 - 3.5     Magnesium    Collection Time: 08/11/22  4:40 AM   Result Value Ref Range    Magnesium 2.0 1.8 - 2.4 mg/dL   Vitamin B12    Collection Time: 08/11/22  4:40 AM   Result Value Ref Range    Vitamin B-12 1314 (H) 193 - 986 pg/mL   Procalcitonin    Collection Time: 08/11/22  4:40 AM   Result Value Ref Range    Procalcitonin <0.05 0.00 - 0.49 ng/mL   D-Dimer, Quantitative    Collection Time: 08/11/22  4:40 AM   Result Value Ref Range    D-Dimer, Quant 0.53 <0.56 ug/ml(FEU)   C-Reactive Protein    Collection Time: 08/11/22  4:40 AM   Result Value Ref Range    CRP 1.0 (H) 0.0 - 0.9 mg/dL   Vitamin D 25 Hydroxy    Collection Time: 08/11/22  4:40 AM   Result Value Ref Range    Vit D, 25-Hydroxy 46.6 30.0 - 100.0 ng/mL   T4, Free    Collection Time: 08/11/22  4:40 AM   Result Value Ref Range    T4 Free 1.1 0.78 - 1.46 NG/DL   T3    Collection Time: 08/11/22  4:40 AM   Result Value Ref Range    T3, Total 0.60 0.60 - 1.81 ng/mL   POCT Glucose    Collection Time: 08/11/22  7:59 AM   Result Value Ref Range    POC Glucose 248 (H) 65 - 100 mg/dL    Performed by: Case    POCT Glucose    Collection Time: 08/11/22 11:42 AM   Result Value Ref Range    POC Glucose 298 (H) 65 - 100 mg/dL    Performed by: Case    Transthoracic echocardiogram (TTE) complete with contrast, bubble, strain, and 3D PRN    Collection Time: 08/11/22  1:00 PM   Result Value Ref Range    LV EDV A2C 156 mL    LV EDV A4C 115 mL    LV ESV A2C 60 mL    LV ESV A4C 44 mL    IVSd 0.9 0.6 - 0.9 cm    LVIDd 3.9 3.9 - 5.3 cm    LVIDs 2.7 cm    LVOT Diameter 2.0 cm    LVOT Mean Gradient 2 mmHg    LVOT VTI 21.0 cm    LVOT Peak Velocity 1.0 m/s    LVOT Peak Gradient 4 mmHg    LVPWd 0.9 0.6 - 0.9 cm    LV E' Lateral Velocity 13 cm/s    LV E' Septal Velocity 12 cm/s    LV Ejection Fraction A2C 62 %    LV Ejection Fraction A4C 62 %    EF BP 61 55 - 100 %    LVOT Area 3.1 cm2    LVOT SV 65.9 ml    LA Minor Axis 5.4 cm    LA Major Axis 5.0 cm    LA Area 2C 17.4 cm2    LA Area 4C 15.4 cm2    LA Volume BP 44 22 - 52 mL    LA Diameter 3.1 cm    AV Mean Velocity 1.1 m/s    AV Mean Gradient 5 mmHg    AV VTI 33.7 cm    AV Peak Velocity 1.6 m/s    AV Peak Gradient 10 mmHg    AV Area by VTI 2.0 cm2    AV Area by Peak Velocity 2.0 cm2    Aortic Root 2.8 cm    Ascending Aorta 2.8 cm    IVC Proxmal 2.4 cm    MV E Wave Deceleration Time 199.0 ms    MV A Velocity 0.90 m/s    MV E Velocity 1.08 m/s    RVIDd 3.4 cm    RV Basal Dimension 2.8 cm    TAPSE 1.9 1.7 cm    Body Surface Area 2.06 m2    Fractional Shortening 2D 31 28 - 44 %    LV ESV Index A4C 22 mL/m2    LV EDV Index A4C 57 mL/m2    LV ESV Index A2C 30 mL/m2    LV EDV Index A2C 78 mL/m2    LVIDd Index 1.94 cm/m2    LVIDs Index 1.34 cm/m2    LV RWT Ratio 0.46     LV Mass 2D 105.3 67 - 162 g    LV Mass 2D Index 52.4 43 - 95 g/m2    MV E/A 1.20     E/E' Ratio (Averaged) 8.65     E/E' Lateral 8.31     E/E' Septal 9.00     LA Volume Index BP 22 16 - 34 ml/m2    LVOT Stroke Volume Index 32.8 mL/m2    LA Size Index 1.54 cm/m2    LA/AO Root Ratio 1.11     Ao Root Index 1.39 cm/m2    Ascending Aorta Index 1.39 cm/m2    AV Velocity Ratio 0.63     LVOT:AV VTI Index 0.62     FERNANDEZ/BSA VTI 1.0 cm2/m2    FERNANDEZ/BSA Peak Velocity 1.0 cm2/m2   POCT Glucose    Collection Time: 08/11/22  4:39 PM   Result Value Ref Range    POC Glucose 271 (H) 65 - 100 mg/dL    Performed by: Case    POCT Glucose    Collection Time: 08/11/22  8:46 PM   Result Value Ref Range    POC Glucose 193 (H) 65 - 100 mg/dL    Performed by: [facility]          Impression / Assessment:     Principal Problem:    Stroke-like symptoms  Active Problems:    Cervical disc disorder at C5-C6 level with radiculopathy    Degeneration of thoracolumbar intervertebral disc    Mixed hyperlipidemia    Uncontrolled type 2 diabetes mellitus with hyperglycemia (HCC)    Class 1 obesity due to excess calories with serious comorbidity and body mass index (BMI) of 31.0 to 31.9 in adult    Acute hypoxemic respiratory failure due to COVID-19 [facility])    Acute renal failure superimposed on stage 3a chronic kidney disease (HCC)    Diabetic sensory polyneuropathy ([facility] [address]  Resolved Problems:    * No resolved hospital problems. *    AMS, right sided weakness testing Covid Positive  Plan / Recommendations / Medical Decision Making:     Recommendations:   -cont to r/o CVA   -cont PT/OT; needs a cognitive eval  -due to being Covid Positive; [facility] cannot consider taking pt until 10d post diagnosis. Tested + 8/10, thus 8/20 could begin precert with [facility] Medicare. Based on therapists initial evaluation; pt performing below baseline independence ;however, is doing fairly well. Cont PT/OT/ST. If she continues from initial evals, she will be too functional for [facility] by 8/20, unless illness from Covid sets her back  -chronic pain issues? Pt taking MS at home, baclofen, neurontin and mobic with underlying hx of C5-6 cervical radiculopathy. Tried finding imaging, none in our system. Will check care everywhere. Was curious as to what side it affects. Could contribute to right sided weakness. MRI brain pending    Discussion with Staff  Reviewed Therapies / Brock Hatchet / Temi Chou / Records    Thank you very much for this referral. We appreciate the opportunity to participate in this patient's care. We will continue to follow. Sveta Dawn MD, Medical Director  [address] [place] [address]

## 2022-08-12 NOTE — PROGRESS NOTES
PHYSICAL THERAPY: Daily Note AM   (Link to Caseload Tracking: PT Visit Days : 2  Time In/Out PT Charge Capture  Rehab Caseload Tracker  Orders    Donovan Corona is a 73 y.o. female   PRIMARY DIAGNOSIS: Stroke-like symptoms  Stroke-like symptoms [R29.90]       Observation: Payor: [facility] MEDICARE / Plan: [facility] SOLUTIONS / Product Type: *No Product type* /     ASSESSMENT:     REHAB RECOMMENDATIONS:   Recommendation to date pending progress:  Setting:  Home Health Therapy    Equipment:    None     ASSESSMENT:  Ms. Beatrice العراقي was supine at arrival with  present and agrees to participate. She initially need Jeffery to get to EOB, but graduated to [address]. She was CGA with standing and walking around room 150ft no AD. Sat level on 2L at arrival and 97%. 1L 95% with walking and 91% post activity supine. Kept on 1L. Pt requesting BTB. Progress since last visit, Ambulation distance, no balance challenges with turns and line mgmt.       SUBJECTIVE:   Ms. Beatrice العراقي states, \"my breathing is good today\"     Social/Functional Lives With: Spouse  Type of Home: House  Home Layout: One level  Home Access: Stairs to enter with rails  Entrance Stairs - Number of Steps: 1  Entrance Stairs - Rails: Both  Bathroom Shower/Tub: Tub/Shower unit, Walk-in shower  Bathroom Toilet: Standard  Home Equipment: Alber Grounds, quad, Walker, rolling  Receives Help From: Family  ADL Assistance: Independent  Homemaking Assistance: Independent  Homemaking Responsibilities: Yes  Ambulation Assistance: Independent  Transfer Assistance: Independent  Active : No  Mode of Transportation: Car  OBJECTIVE:     PAIN: Mynor Males / O2: Jeanine Bores / Eulogio Lyme / DRAINS:   Pre Treatment:          Post Treatment: 0 Vitals        Oxygen  O2 Flow Rate (L/min): 1 L/min IV    RESTRICTIONS/PRECAUTIONS:  Restrictions/Precautions  Restrictions/Precautions: Fall Risk, Isolation  Restrictions/Precautions: Fall Risk, Isolation     MOBILITY: I Mod I S SBA CGA Min Mod Max Total  NT x2 Comments: Bed Mobility    Rolling [] [] [] [] [] [x] [] [] [] [] []    Supine to Sit [] [] [] [] [] [x] [] [] [] [] []    Scooting [] [] [] [x] [] [] [] [] [] [] []    Sit to Supine [] [] [] [x] [] [] [] [] [] [] []    Transfers    Sit to Stand [] [] [] [] [x] [] [] [] [] [] []    Bed to Chair [] [] [] [] [] [] [] [] [] [x] []    Stand to Sit [] [] [] [] [x] [] [] [] [] [] []     [] [] [] [] [] [] [] [] [] [] []    I=Independent, Mod I=Modified Independent, S=Supervision, SBA=Standby Assistance, CGA=Contact Guard Assistance,   Min=Minimal Assistance, Mod=Moderate Assistance, Max=Maximal Assistance, Total=Total Assistance, NT=Not Tested    BALANCE: Good Fair+ Fair Fair- Poor NT Comments   Sitting Static [] [x] [] [] [] []    Sitting Dynamic [] [x] [] [] [] []              Standing Static [] [x] [] [] [] []    Standing Dynamic [] [x] [] [] [] []      GAIT: I Mod I S SBA CGA Min Mod Max Total  NT x2 Comments:   Level of Assistance [] [] [] [x] [x] [] [] [] [] [] []    Distance 150 feet    DME None    Gait Quality Decreased step clearance, Decreased step length, Path deviations , and Shuffling     Weightbearing Status      Stairs      I=Independent, Mod I=Modified Independent, S=Supervision, SBA=Standby Assistance, CGA=Contact Guard Assistance,   Min=Minimal Assistance, Mod=Moderate Assistance, Max=Maximal Assistance, Total=Total Assistance, NT=Not Tested    PLAN:   ACUTE PHYSICAL THERAPY GOALS:   (Developed with and agreed upon by patient and/or caregiver.)  Pt will perform supine to/from sit with mod I in 7 treatment days. Pt will perform sit to/from stand with mod I and LRAD in 7 treatment days. Pt will ambulate 150 ft with mod I and LRAD in 7 treatment days. Pt will negotiate 1 stairs with mod I and rail in 7 treatment days. Pt will be independent with HEP in 7 days.     FREQUENCY AND DURATION: 3 times/week for duration of hospital stay or until stated goals are met, whichever comes first.    TREATMENT:   TREATMENT:

## 2022-08-13 VITALS
RESPIRATION RATE: 18 BRPM | OXYGEN SATURATION: 97 % | BODY MASS INDEX: 31.08 KG/M2 | DIASTOLIC BLOOD PRESSURE: 109 MMHG | HEART RATE: 63 BPM | HEIGHT: 67 IN | WEIGHT: 198 LBS | SYSTOLIC BLOOD PRESSURE: 156 MMHG | TEMPERATURE: 98.2 F

## 2022-08-13 LAB
ANION GAP SERPL CALC-SCNC: 2 MMOL/L (ref 7–16)
BUN SERPL-MCNC: 13 MG/DL (ref 8–23)
CALCIUM SERPL-MCNC: 8.9 MG/DL (ref 8.3–10.4)
CHLORIDE SERPL-SCNC: 107 MMOL/L (ref 98–107)
CO2 SERPL-SCNC: 29 MMOL/L (ref 21–32)
CREAT SERPL-MCNC: 0.9 MG/DL (ref 0.6–1)
GLUCOSE BLD STRIP.AUTO-MCNC: 105 MG/DL (ref 65–100)
GLUCOSE BLD STRIP.AUTO-MCNC: 165 MG/DL (ref 65–100)
GLUCOSE SERPL-MCNC: 112 MG/DL (ref 65–100)
POTASSIUM SERPL-SCNC: 4.1 MMOL/L (ref 3.5–5.1)
SERVICE CMNT-IMP: ABNORMAL
SERVICE CMNT-IMP: ABNORMAL
SODIUM SERPL-SCNC: 138 MMOL/L (ref 136–145)

## 2022-08-13 PROCEDURE — 82962 GLUCOSE BLOOD TEST: CPT

## 2022-08-13 PROCEDURE — 6360000002 HC RX W HCPCS: Performed by: INTERNAL MEDICINE

## 2022-08-13 PROCEDURE — 80048 BASIC METABOLIC PNL TOTAL CA: CPT

## 2022-08-13 PROCEDURE — 96372 THER/PROPH/DIAG INJ SC/IM: CPT

## 2022-08-13 PROCEDURE — 6370000000 HC RX 637 (ALT 250 FOR IP): Performed by: FAMILY MEDICINE

## 2022-08-13 PROCEDURE — 2700000000 HC OXYGEN THERAPY PER DAY

## 2022-08-13 PROCEDURE — 94640 AIRWAY INHALATION TREATMENT: CPT

## 2022-08-13 PROCEDURE — 94760 N-INVAS EAR/PLS OXIMETRY 1: CPT

## 2022-08-13 PROCEDURE — 2580000003 HC RX 258: Performed by: INTERNAL MEDICINE

## 2022-08-13 PROCEDURE — 6360000002 HC RX W HCPCS: Performed by: FAMILY MEDICINE

## 2022-08-13 PROCEDURE — 36415 COLL VENOUS BLD VENIPUNCTURE: CPT

## 2022-08-13 PROCEDURE — 6370000000 HC RX 637 (ALT 250 FOR IP): Performed by: INTERNAL MEDICINE

## 2022-08-13 RX ORDER — SACCHAROMYCES BOULARDII 250 MG
250 CAPSULE ORAL 2 TIMES DAILY
Qty: 14 CAPSULE | Refills: 0 | Status: SHIPPED | OUTPATIENT
Start: 2022-08-13 | End: 2022-08-20

## 2022-08-13 RX ORDER — DEXAMETHASONE 6 MG/1
6 TABLET ORAL DAILY
Qty: 6 TABLET | Refills: 0 | Status: SHIPPED | OUTPATIENT
Start: 2022-08-14 | End: 2022-08-20

## 2022-08-13 RX ORDER — ASPIRIN 81 MG/1
81 TABLET ORAL DAILY
Qty: 30 TABLET | Refills: 3 | Status: SHIPPED | OUTPATIENT
Start: 2022-08-14

## 2022-08-13 RX ORDER — ASPIRIN 81 MG/1
81 TABLET, CHEWABLE ORAL DAILY
Qty: 30 TABLET | Refills: 3 | Status: SHIPPED | OUTPATIENT
Start: 2022-08-13 | End: 2022-08-13 | Stop reason: HOSPADM

## 2022-08-13 RX ORDER — CLOPIDOGREL BISULFATE 75 MG/1
75 TABLET ORAL DAILY
Qty: 19 TABLET | Refills: 0 | Status: SHIPPED | OUTPATIENT
Start: 2022-08-14 | End: 2022-10-14

## 2022-08-13 RX ORDER — VANCOMYCIN HYDROCHLORIDE 125 MG/1
125 CAPSULE ORAL 4 TIMES DAILY
Qty: 40 CAPSULE | Refills: 0 | Status: SHIPPED | OUTPATIENT
Start: 2022-08-13 | End: 2022-08-23

## 2022-08-13 RX ADMIN — MOMETASONE FUROATE AND FORMOTEROL FUMARATE DIHYDRATE 2 PUFF: 100; 5 AEROSOL RESPIRATORY (INHALATION) at 09:25

## 2022-08-13 RX ADMIN — ASPIRIN 81 MG: 81 TABLET ORAL at 07:50

## 2022-08-13 RX ADMIN — MORPHINE SULFATE 15 MG: 15 TABLET, FILM COATED, EXTENDED RELEASE ORAL at 07:50

## 2022-08-13 RX ADMIN — CLOPIDOGREL BISULFATE 75 MG: 75 TABLET ORAL at 07:50

## 2022-08-13 RX ADMIN — GABAPENTIN 300 MG: 300 CAPSULE ORAL at 07:50

## 2022-08-13 RX ADMIN — VANCOMYCIN HYDROCHLORIDE 125 MG: 5 INJECTION, POWDER, LYOPHILIZED, FOR SOLUTION INTRAVENOUS at 12:28

## 2022-08-13 RX ADMIN — VANCOMYCIN HYDROCHLORIDE 125 MG: 5 INJECTION, POWDER, LYOPHILIZED, FOR SOLUTION INTRAVENOUS at 05:29

## 2022-08-13 RX ADMIN — POTASSIUM CHLORIDE 40 MEQ: 1500 TABLET, EXTENDED RELEASE ORAL at 07:50

## 2022-08-13 RX ADMIN — HEPARIN SODIUM 5000 UNITS: 5000 INJECTION INTRAVENOUS; SUBCUTANEOUS at 05:29

## 2022-08-13 RX ADMIN — DEXAMETHASONE 6 MG: 4 TABLET ORAL at 07:50

## 2022-08-13 ASSESSMENT — PAIN SCALES - GENERAL
PAINLEVEL_OUTOF10: 0
PAINLEVEL_OUTOF10: 0

## 2022-08-13 NOTE — PROGRESS NOTES
Dr reba zamora served regarding dc vs bp 156/106 informed she is asymptomatic given approval for dc papers reviewed with pt and spouse iv removed without issue cath intact dc complete

## 2022-08-13 NOTE — DISCHARGE SUMMARY
Hospitalist Discharge Summary   Admit Date:  8/10/2022  8:19 PM   DC Note date: 2022  Name:  Edwin Saab   Age:  73 y.o. Sex:  female  :  1955   MRN:  [id number]   Room:  [facility]  PCP:  Marlys Dobbins MD    Presenting Complaint: No chief complaint on file. Initial Admission Diagnosis: Stroke-like symptoms [R29.90]     Problem List for this Hospitalization (present on admission):    Principal Problem:    Stroke-like symptoms  Active Problems:    Cervical disc disorder at C5-C6 level with radiculopathy    Degeneration of thoracolumbar intervertebral disc    Mixed hyperlipidemia    Uncontrolled type 2 diabetes mellitus with hyperglycemia (HCC)    Class 1 obesity due to excess calories with serious comorbidity and body mass index (BMI) of 31.0 to 31.9 in adult    Acute hypoxemic respiratory failure due to COVID-19 [facility])    Acute renal failure superimposed on stage 3a chronic kidney disease (HCC)    Diabetic sensory polyneuropathy ([facility]  Resolved Problems:    * No resolved hospital problems. *      Hospital Course:  Edwin Saab is a 73 y.o. female with medical history of obesity, T2DM, HTN, HLD, Asthma who presented to [place] ED from home with confusion, drooling, right sided weakness that started acutely yesterday at 1800 and worsed today associated with trouble walking. Denied any trouble speaking. CTH w/o contrast showed no acute findings. Evaluated by tele-neuro with NIHSS 8 and ddx stroke, acute toxic./metabolic encephalopathy. She also c/o SOB, cough, congestion x2-3 days with intermittent wheezing. Has h/o asthma and has been using albuterol with some relief. She was found to be covid positive and spo2 86% on RA. She rec'd ASA 25 and IV decadron and transferred to  for further work up. Mrs. Chhaya Jimenez was admitted. Her symptoms resolved. Her  noted that issues with the hand had gotten worse about more than 2 weeks ago prior to her acute presentation.  Further work-up revealed that she had C. difficile diarrhea and was dehydrated. She has known chronic cervical radiculopathy and lumbar spine radiculopathy. MRI was done and showed a tiny remote right cerebellar infarct. She was started on vancomycin. Her symptoms are improved. She is stable for discharge to home to follow-up as a outpatient. Active Problems:    Stroke-like symptoms  - TpA was not indicated, presented outside of window  -  continue aspirin 81 mg p.o. daily  -Plavix 75 mg p.o. daily for total of 21 days  -Continue statin  -Echo showed a normal EF with no shunt  -Referral to cardiology for 30-day event monitor for A. Fib/ a flutter  - Outpatient referral to Neurology at the discretion of the PCP       Acute renal failure superimposed on stage 3a chronic kidney disease ([address]  Scr 1.87 (Bcr ~1)  Renal function has normalized      Acute Hypoxemic Respiratory Failure 2/2 COVID-19   Monitor- s/p IV in ED. Decadron 6 mg x 9 doses in AM  -Continue O2: 3L Wean as able  -Encourage awake proning as tolerated, anti-tussive PRN, Bronchodilators     Acute encephalopathy -  resolved          Cervical disc disorder at C5-C6 level with radiculopathy // Degeneration of thoracolumbar intervertebral disc  Continue to hold morphine 15 mg BID, gabapentin 600 mg TID, and balocfen pending mentation improvement and renal recovery     HTN   Continue home meds        Uncontrolled type 2 diabetes mellitus with hyperglycemia ([address]  Continue home meds     class 1 obesity due to excess calories with serious comorbidity and body mass index (BMI) of 31.0 to 31.9 in adult  Weight loss advised    Mild intermittent asthma   Continue home meds      Diabetic sensory polyneuropathy ([address]  Continue home gabapentin    Plan was discussed with the patient and the IDT. All questions answered. Patient was stable at time of discharge. Instructions given to call a physician or return if any concerns.     Current Discharge Medication List START taking these medications    Details   dexamethasone (DECADRON) 6 MG tablet Take 1 tablet by mouth in the morning for 6 doses. Qty: 6 tablet, Refills: 0      vancomycin (VANCOCIN) 125 MG capsule Take 1 capsule by mouth in the morning and 1 capsule at noon and 1 capsule in the evening and 1 capsule before bedtime. Do all this for 10 days. Qty: 40 capsule, Refills: 0      saccharomyces boulardii (FLORASTOR) 250 MG capsule Take 1 capsule by mouth in the morning and 1 capsule before bedtime. Do all this for 7 days. Qty: 14 capsule, Refills: 0      aspirin (ASPIRIN CHILDRENS) 81 MG chewable tablet Take 1 tablet by mouth in the morning. Qty: 30 tablet, Refills: 3           CONTINUE these medications which have NOT CHANGED    Details   MORPHINE SULFATE ER PO Take 15 mg by mouth in the morning and at bedtime      atorvastatin (LIPITOR) 10 MG tablet Take 10 mg by mouth daily      Cholecalciferol 75 MCG (3000 UT) TABS Take by mouth      gabapentin (NEURONTIN) 600 MG tablet Take 600 mg by mouth 3 times daily. meloxicam (MOBIC) 15 MG tablet Take 15 mg by mouth daily      metFORMIN (GLUCOPHAGE) 1000 MG tablet Take 1,000 mg by mouth in the morning and 1,000 mg at noon and 1,000 mg in the evening. Take with meals.  Per Patient List.      valsartan-hydroCHLOROthiazide (DIOVAN-HCT) 320-12.5 MG per tablet Take 1 tablet by mouth daily      verapamil (VERELAN) 240 MG extended release capsule Take 240 mg by mouth daily           STOP taking these medications       baclofen (LIORESAL) 10 MG tablet Comments:   Reason for Stopping:               Procedures done this admission:  * No surgery found *    Consults this admission:  IP CONSULT TO STROKE COORDINATOR  IP CONSULT TO CASE MANAGEMENT  IP CONSULT TO PHYSICAL MEDICINE REHAB    Echocardiogram results:  08/10/22    TRANSTHORACIC ECHOCARDIOGRAM (TTE) COMPLETE (CONTRAST/BUBBLE/3D PRN) 08/11/2022  3:06 PM (Final)    Interpretation Summary  Formatting of this result is different from the original.      Left Ventricle: Normal left ventricular systolic function with a visually estimated EF of 60 - 65%. Left ventricle size is normal. Normal wall thickness. Normal wall motion. Normal diastolic function. Contrast used: Definity. Signed by: Taft Severs, MD on 8/11/2022  3:06 PM      Diagnostic Imaging/Tests:   CT Head W/O Contrast    Result Date: 8/10/2022  No acute intracranial abnormality. MRA head without contrast    Result Date: 8/12/2022  No acute large vessel occlusion or high-grade stenosis. MRA NECK W CONTRAST    Result Date: 8/12/2022  No significant stenosis identified in the carotid and vertebral arteries. XR CHEST PORTABLE    Result Date: 8/10/2022  Slightly underexpanded lungs, otherwise no acute abnormality. MRI BRAIN W WO CONTRAST    Result Date: 8/12/2022  1. Mildly compromised examination without evidence of acute infarction. 2. Tiny remote right cerebellar infarction.         Recent Labs     08/10/22  1628 08/10/22  1350   CULTURE NO GROWTH 3 DAYS NO GROWTH 3 DAYS       Labs: Results:       BMP, Mg, Phos Recent Labs     08/11/22  0440 08/12/22  1206 08/13/22  0349    137 138   K 4.6 3.3* 4.1    104 107   CO2 28 30 29   ANIONGAP 6* 3* 2*   BUN 35* 18 13   CREATININE 1.40* 0.90 0.90   LABGLOM 40* >60 >60   GFRAA 49* >60 >60   CALCIUM 8.9 8.5 8.9   GLUCOSE 260* 141* 112*   MG 2.0 1.7*  --       CBC Recent Labs     08/11/22  0440 08/12/22  1206   WBC 6.2 9.3   RBC 4.51 4.03*   HGB 12.2 11.0*   HCT 38.5 33.1*   MCV 85.4 82.1   MCH 27.1 27.3   MCHC 31.7 33.2   RDW 13.0 12.8    243   MPV 11.1 10.5   NRBC 0.00 0.00   SEGS 82* 86*   LYMPHOPCT 15 9*   EOSRELPCT 0* 0*   MONOPCT 2* 5   BASOPCT 0 0   IMMGRAN 1 0   SEGSABS 5.1 8.0   LYMPHSABS 0.9 0.8   EOSABS 0.0 0.0   MONOSABS 0.2 0.5   BASOSABS 0.0 0.0   ABSIMMGRAN 0.0 0.0      LFT Recent Labs     08/11/22  0440   BILITOT 0.5   ALKPHOS 97   AST 16   ALT 19   PROT 7.4   LABALBU 3.6   GLOB 3.8*      Cardiac  Lab Results   Component Value Date/Time    NTPROBNP 116 08/10/2022 01:50 PM      Coags No results found for: PROTIME, INR, APTT   A1c Lab Results   Component Value Date/Time    LABA1C 7.2 08/11/2022 04:40 AM    LABA1C 9.0 09/29/2020 10:50 AM     08/11/2022 04:40 AM     09/29/2020 10:50 AM      Lipids Lab Results   Component Value Date/Time    CHOL 159 08/11/2022 04:40 AM    LDLCALC 92.2 08/11/2022 04:40 AM    LABVLDL 17.8 08/11/2022 04:40 AM    HDL 49 08/11/2022 04:40 AM    CHOLHDLRATIO 3.2 08/11/2022 04:40 AM    TRIG 89 08/11/2022 04:40 AM      Thyroid  Lab Results   Component Value Date/Time    TSHELE 0.29 08/11/2022 04:40 AM        Most Recent UA No results found for: COLORU, APPEARANCE, SPECGRAV, LABPH, PROTEINU, GLUCOSEU, KETUA, BILIRUBINUR, BLOODU, UROBILINOGEN, NITRU, LEUKOCYTESUR, WBCUA, RBCUA, EPITHUA, BACTERIA, LABCAST, MUCUS       All Labs from Last 24 Hrs:  Recent Results (from the past 24 hour(s))   POCT Glucose    Collection Time: 08/12/22  5:42 PM   Result Value Ref Range    POC Glucose 268 (H) 65 - 100 mg/dL    Performed by: Barrie    POCT Glucose    Collection Time: 08/12/22  8:47 PM   Result Value Ref Range    POC Glucose 174 (H) 65 - 100 mg/dL    Performed by: NatachaTHOR    Basic Metabolic Panel w/ Reflex to MG    Collection Time: 08/13/22  3:49 AM   Result Value Ref Range    Sodium 138 136 - 145 mmol/L    Potassium 4.1 3.5 - 5.1 mmol/L    Chloride 107 98 - 107 mmol/L    CO2 29 21 - 32 mmol/L    Anion Gap 2 (L) 7 - 16 mmol/L    Glucose 112 (H) 65 - 100 mg/dL    BUN 13 8 - 23 MG/DL    Creatinine 0.90 0.6 - 1.0 MG/DL    GFR African American >60 >60 ml/min/1.73m2    GFR Non- >60 >60 ml/min/1.73m2    Calcium 8.9 8.3 - 10.4 MG/DL   POCT Glucose    Collection Time: 08/13/22  7:42 AM   Result Value Ref Range    POC Glucose 105 (H) 65 - 100 mg/dL    Performed by:  Sal    POCT Glucose    Collection Time: 08/13/22 11:16 AM   Result Value Ref Range    POC Glucose 165 (H) 65 - 100 mg/dL    Performed by: Sal        Allergies   Allergen Reactions    Codeine Myalgia       There is no immunization history on file for this patient. Recent Vital Data:  Patient Vitals for the past 24 hrs:   Temp Pulse Resp BP SpO2   08/13/22 1114 99.3 °F (37.4 °C) 54 19 (!) 137/57 95 %   08/13/22 0925 -- 68 16 -- 98 %   08/13/22 0739 98.4 °F (36.9 °C) 70 19 (!) 161/94 97 %   08/13/22 0330 -- -- -- (!) 149/83 --   08/13/22 0312 97.3 °F (36.3 °C) 75 20 (!) 170/96 97 %   08/12/22 2318 -- -- 16 -- --   08/12/22 2248 -- -- 16 -- --   08/12/22 2244 97.9 °F (36.6 °C) 59 18 (!) 156/86 98 %   08/12/22 2034 -- 68 17 -- 95 %   08/12/22 1926 98.2 °F (36.8 °C) 63 19 (!) 147/75 97 %   08/12/22 1513 98.4 °F (36.9 °C) 66 18 (!) 152/83 97 %       Oxygen Therapy  SpO2: 95 %  Pulse Oximeter Device Mode: Intermittent  Pulse Oximeter Device Location: Left, Finger  O2 Device: Nasal cannula  Skin Assessment: Clean, dry, & intact  FiO2 : 28 %  O2 Flow Rate (L/min): 1 L/min (weaned to room air)    Estimated body mass index is 31.01 kg/m² as calculated from the following:    Height as of this encounter: 5' 7\" (1.702 m). Weight as of this encounter: 198 lb (89.8 kg). Intake/Output Summary (Last 24 hours) at 8/13/2022 1502  Last data filed at 8/13/2022 6590  Gross per 24 hour   Intake 1440 ml   Output --   Net 1440 ml         Physical Exam:    General:    Well nourished. No overt distress  Head:  Normocephalic, atraumatic  Eyes:  Sclerae appear normal.  Pupils equally round. HENT:  Nares appear normal, no drainage. Moist mucous membranes  Neck:  No restricted ROM. Trachea midline  CV:   RRR. No m/r/g. No JVD  Lungs:   CTAB. No wheezing, rhonchi, or rales. Respirations even, unlabored  Abdomen:   Soft, nontender, nondistended. Extremities: Warm and dry. No cyanosis or clubbing. No edema. Skin:     No rashes.   Normal coloration  Neuro:  CN II-XII grossly intact. Psych:  Normal mood and affect. Signed:  Jose Devries MD    Part of this note may have been written by using a voice dictation software. The note has been proof read but may still contain some grammatical/other typographical errors.

## 2022-08-13 NOTE — CARE COORDINATION
Patient has d/c orders for today. PT has recommended HH. Patient agreeable to [facility]. Referral completed. Family will transport patient home. Patient has met all treatment goals / milestones. CM will continue to monitor and remain available for any needs or concerns that may occur. 08/11/22 2125   Service Assessment   Patient Orientation Alert and Oriented;Person;Situation;Self;Place   Cognition Alert   Primary Caregiver Self   Support Systems Spouse/Significant Other   Patient's Healthcare Decision Maker is: Legal Next of Kin   PCP Verified by CM Yes   Prior Functional Level Independent in ADLs/IADLs   Current Functional Level Assistance with the following:;Dressing; Bathing; Toileting;Cooking;Housework   Can patient return to prior living arrangement Yes   Ability to make needs known: Good   Family able to assist with home care needs: Yes   Would you like for me to discuss the discharge plan with any other family members/significant others, and if so, who? Yes   Social/Functional History   Lives With Spouse   Type of [address] One level   [facility] to enter with rails   Entrance Stairs - Number of Steps 1   Entrance Stairs - Rails Both   Bathroom Shower/Tub Tub/Shower unit; Walk-in shower   Bathroom Toilet [address] rolling   Receives Help From Family   ADL Assistance Independent   Homemaking Assistance Independent   Homemaking Responsibilities Yes   Ambulation Assistance Independent   Transfer Assistance Independent   Active  No   Mode of Transportation Car   Discharge Planning   Type of Residence Acute Rehab   Living Arrangements Spouse/Significant Other   Current Services Prior To Admission Home Care;None   Potential Assistance Needed N/A   Potential Assistance Purchasing Medications No   Patient expects to be discharged to: Acute rehab   One/Two Story Residence One story   History of falls?  [address] Discharge   [employer] Resource Information Provided?  No

## 2022-08-14 ENCOUNTER — HOME HEALTH ADMISSION (OUTPATIENT)
Dept: HOME HEALTH SERVICES | Facility: HOME HEALTH | Age: 67
End: 2022-08-14

## 2022-08-15 ENCOUNTER — CARE COORDINATION (OUTPATIENT)
Dept: CARE COORDINATION | Facility: CLINIC | Age: 67
End: 2022-08-15

## 2022-08-15 ENCOUNTER — HOME CARE VISIT (OUTPATIENT)
Dept: HOME HEALTH SERVICES | Facility: HOME HEALTH | Age: 67
End: 2022-08-15

## 2022-08-15 LAB
BACTERIA SPEC CULT: NORMAL
BACTERIA SPEC CULT: NORMAL
SERVICE CMNT-IMP: NORMAL
SERVICE CMNT-IMP: NORMAL

## 2022-08-15 NOTE — CARE COORDINATION
Cardiology follow up appointment with [facility] scheduled 8/19/2022 at 3:00 pm at [place] office.  Patient informed

## 2022-08-15 NOTE — CARE COORDINATION
[facility] Transitions Initial Follow Up Call    Call within 2 business days of discharge: Yes    Patient: Bettina Burrows Patient : 1955   MRN: [id number]  Reason for Admission: Stroke like symptoms, Acute respiratory failure due to COVID-19  Discharge Date: 22 RARS: Readmission Risk Score: 13.1      Last Discharge [facility]       Date Complaint Diagnosis Description Type Department Provider    8/10/22  Stroke-like symptoms Admission (Discharged) 215 Florentino Stephenson MD    8/10/22 Extremity Weakness COVID . [facility] (TRANSFER) XIN Lanier MD             Patient contacted regarding COVID-19 diagnosis. Discussed COVID-19 related testing which was available at this time. Test results were positive. Patient informed of results, if available? Yes. Care Transition Nurse contacted the patient by telephone to perform post discharge assessment. Call within 2 business days of discharge: Yes. Verified name and  with patient as identifiers. Provided introduction to self, and explanation of the CTN/ACM role, and reason for call due to risk factors for infection and/or exposure to COVID-19. Symptoms reviewed with patient who verbalized the following symptoms: no new symptoms  no worsening symptoms. Due to no new or worsening symptoms encounter was not routed to provider for escalation. Discussed follow-up appointments. If no appointment was previously scheduled, appointment scheduling offered: scheduled. [facility] follow up appointment(s):   Future Appointments   Date Time Provider Isabela Antony   8/15/2022  2:00 PM Yisel Ochoa PT [facility] HOME HEAL     Non-[facility] follow up appointment(s): PCP- 2022 at 10:00 am    Non-face-to-face services provided:  Obtained and reviewed discharge summary and/or continuity of care documents     Advance Care Planning:   Does patient have an Advance Directive:  decision maker updated.      Educated patient about risk for severe COVID-19 due to risk factors according to CDC guidelines. CTN reviewed discharge instructions, medical action plan and red flag symptoms with the patient who verbalized understanding. Discussed COVID vaccination status: Patient reports vaccinated. Education provided on COVID-19 vaccination as appropriate. Discussed exposure protocols and quarantine with CDC Guidelines. Patient was given an opportunity to verbalize any questions and concerns and agrees to contact CTN or health care provider for questions related to their healthcare. Reviewed and educated patient on any new and changed medications related to discharge diagnosis     Was patient discharged with a pulse oximeter? no      CTN provided contact information. Plan for follow-up call in 5-7 days based on severity of symptoms and risk factors.       Care Transitions 24 Hour Call    Schedule Follow Up Appointment with PCP: Completed  Do you have a copy of your discharge instructions?: Yes  Do you have all of your prescriptions and are they filled?: Yes  Have you been contacted by a [facility] Pharmacist?: No  Have you scheduled your follow up appointment?: Yes  How are you going to get to your appointment?: Car - family or friend to transport  Do you have support at home?: Partner/Spouse/SO  Are you an active caregiver in your home?: No  Care Transitions Interventions         Follow Up  Future Appointments   Date Time Provider Isabela Antony   8/15/2022  2:00 PM Daphne Pemberton, PT [address], [facility]

## 2022-08-19 ENCOUNTER — OFFICE VISIT (OUTPATIENT)
Dept: CARDIOLOGY CLINIC | Age: 67
End: 2022-08-19
Payer: MEDICARE

## 2022-08-19 VITALS
WEIGHT: 193 LBS | SYSTOLIC BLOOD PRESSURE: 124 MMHG | DIASTOLIC BLOOD PRESSURE: 78 MMHG | BODY MASS INDEX: 30.29 KG/M2 | HEIGHT: 67 IN | HEART RATE: 62 BPM

## 2022-08-19 DIAGNOSIS — E66.09 CLASS 1 OBESITY DUE TO EXCESS CALORIES WITH SERIOUS COMORBIDITY AND BODY MASS INDEX (BMI) OF 31.0 TO 31.9 IN ADULT: Chronic | ICD-10-CM

## 2022-08-19 DIAGNOSIS — E11.65 UNCONTROLLED TYPE 2 DIABETES MELLITUS WITH HYPERGLYCEMIA (HCC): ICD-10-CM

## 2022-08-19 DIAGNOSIS — N17.1 ACUTE RENAL FAILURE WITH ACUTE RENAL CORTICAL NECROSIS SUPERIMPOSED ON STAGE 3A CHRONIC KIDNEY DISEASE (HCC): ICD-10-CM

## 2022-08-19 DIAGNOSIS — R07.2 PRECORDIAL PAIN: ICD-10-CM

## 2022-08-19 DIAGNOSIS — E78.2 MIXED HYPERLIPIDEMIA: ICD-10-CM

## 2022-08-19 DIAGNOSIS — N18.31 ACUTE RENAL FAILURE WITH ACUTE RENAL CORTICAL NECROSIS SUPERIMPOSED ON STAGE 3A CHRONIC KIDNEY DISEASE (HCC): ICD-10-CM

## 2022-08-19 DIAGNOSIS — U07.1 COVID: ICD-10-CM

## 2022-08-19 DIAGNOSIS — I10 HYPERTENSION, BENIGN: Primary | ICD-10-CM

## 2022-08-19 DIAGNOSIS — R29.90 STROKE-LIKE SYMPTOMS: ICD-10-CM

## 2022-08-19 PROCEDURE — 1111F DSCHRG MED/CURRENT MED MERGE: CPT | Performed by: INTERNAL MEDICINE

## 2022-08-19 PROCEDURE — G8428 CUR MEDS NOT DOCUMENT: HCPCS | Performed by: INTERNAL MEDICINE

## 2022-08-19 PROCEDURE — 99215 OFFICE O/P EST HI 40 MIN: CPT | Performed by: INTERNAL MEDICINE

## 2022-08-19 PROCEDURE — 3017F COLORECTAL CA SCREEN DOC REV: CPT | Performed by: INTERNAL MEDICINE

## 2022-08-19 PROCEDURE — 2022F DILAT RTA XM EVC RTNOPTHY: CPT | Performed by: INTERNAL MEDICINE

## 2022-08-19 PROCEDURE — 1036F TOBACCO NON-USER: CPT | Performed by: INTERNAL MEDICINE

## 2022-08-19 PROCEDURE — 93000 ELECTROCARDIOGRAM COMPLETE: CPT | Performed by: INTERNAL MEDICINE

## 2022-08-19 PROCEDURE — G8400 PT W/DXA NO RESULTS DOC: HCPCS | Performed by: INTERNAL MEDICINE

## 2022-08-19 PROCEDURE — G8417 CALC BMI ABV UP PARAM F/U: HCPCS | Performed by: INTERNAL MEDICINE

## 2022-08-19 PROCEDURE — 1090F PRES/ABSN URINE INCON ASSESS: CPT | Performed by: INTERNAL MEDICINE

## 2022-08-19 PROCEDURE — 3051F HG A1C>EQUAL 7.0%<8.0%: CPT | Performed by: INTERNAL MEDICINE

## 2022-08-19 PROCEDURE — 1123F ACP DISCUSS/DSCN MKR DOCD: CPT | Performed by: INTERNAL MEDICINE

## 2022-08-19 RX ORDER — DOXAZOSIN MESYLATE 1 MG/1
1 TABLET ORAL NIGHTLY
COMMUNITY

## 2022-08-19 NOTE — PROGRESS NOTES
CHOLECYSTECTOMY      COLONOSCOPY      HYSTERECTOMY (CERVIX STATUS UNKNOWN)       Family History   Problem Relation Age of Onset    Diabetes Mother     Hypertension Father     Colon Cancer Father     Heart Disease Father     Diabetes Sister     Colon Polyps Sister     Colon Cancer Brother     Diabetes Brother     Colon Cancer Maternal Grandmother     Colon Cancer Maternal Grandfather      Social History     Tobacco Use    Smoking status: Never    Smokeless tobacco: Never   Substance Use Topics    Alcohol use: Never       ROS:    Review of Systems   Constitutional: Negative for decreased appetite, malaise/fatigue and weight loss. Cardiovascular:  Positive for chest pain. Negative for dyspnea on exertion and leg swelling. PHYSICAL EXAM:   /78   Pulse 62   Ht 5' 7\" (1.702 m)   Wt 193 lb (87.5 kg) Comment: with shoes  BMI 30.23 kg/m²      Physical Exam  Constitutional:       General: She is not in acute distress. Cardiovascular:      Rate and Rhythm: Normal rate and regular rhythm. Heart sounds: No murmur heard. No gallop. Neurological:      Mental Status: She is alert. Medical problems and test results were reviewed with the patient today. Results for orders placed or performed in visit on 08/19/22   Cardiac event monitor   Result Value Ref Range    Body Surface Area 2.03 m2   Results for orders placed or performed in visit on 08/19/22   EKG 12 Lead    Impression    Normal sinus rhythm rate of 62. Normal intervals. No ST changes.        Lab Results   Component Value Date/Time     08/13/2022 03:49 AM    K 4.1 08/13/2022 03:49 AM     08/13/2022 03:49 AM    CO2 29 08/13/2022 03:49 AM    BUN 13 08/13/2022 03:49 AM    GFRAA >60 08/13/2022 03:49 AM     Lab Results   Component Value Date/Time    CHOL 159 08/11/2022 04:40 AM    HDL 49 08/11/2022 04:40 AM     Lab Results   Component Value Date/Time    ALT 19 08/11/2022 04:40 AM     ASSESSMENT and PLAN    Macario Mccormick was seen today for new patient and atrial fibrillation. Diagnoses and all orders for this visit:    Hypertension, benign patient's had hypertension which is stable today she will stay on her valsartan hydrochlorothiazide and verapamil to 40  -     EKG 12 Lead  -     Nuclear stress test with myocardial perfusion; Future    Acute renal failure with acute renal cortical necrosis superimposed on stage 3a chronic kidney disease ([facility] patient has acute renal insufficiency on top of chronic disease probably related to COVID and other issues and diabetes. Class 1 obesity due to excess calories with serious comorbidity and body mass index (BMI) of 31.0 to 31.9 in adult  Is continue to lose weight  Mixed hyperlipidemia she is currently on Lipitor 10 mg.  -     Cardiac event monitor; Future  -     Nuclear stress test with myocardial perfusion; Future    Stroke-like symptoms patient presented with strokelike symptoms at [facility] no obvious event occurred by MRI work-up did not find any obvious source echo was negative. We will put a 30-day event monitor look for A. fib although I doubt she had that eventually. -     Cardiac event monitor; Future  -     Nuclear stress test with myocardial perfusion; Future    Precordial pain she does have some occasional chest discomfort certainly risk for coronary disease and stress dobutamine echo 2 years ago was negative or doing a Lexiscan nuclear study. -     Cardiac event monitor; Future  -     Nuclear stress test with myocardial perfusion; Future    COVID patient tested positive for recent mission to [facility] with could contribute abilities issues. Uncontrolled type 2 diabetes mellitus with hyperglycemia ([facility] also diabetes with poorly controlled is a follow-up close with her family doctor  -     Nuclear stress test with myocardial perfusion; Future      [unfilled]      No follow-up provider specified. Thank you for allowing me to participate in this patient's care. Please call or contact me if there are any questions or concerns regarding the above.       Paty Reyes MD  08/19/22  6:04 PM        Consult note

## 2022-08-22 ENCOUNTER — CARE COORDINATION (OUTPATIENT)
Dept: CARE COORDINATION | Facility: CLINIC | Age: 67
End: 2022-08-22

## 2022-08-22 NOTE — CARE COORDINATION
[facility] Transitions Follow Up Call    2022    Patient: Maurizio Corrigan  Patient : 1955   MRN: [id number]  Reason for Admission:  Stroke like symptoms, Acute respiratory failure due to COVID-19  Discharge Date: 22 RARS: Readmission Risk Score: 13.1       Patient contacted regarding COVID-19 diagnosis. Discussed COVID-19 related testing which was available at this time. Test results were positive. Patient informed of results, if available? Yes    Care Transition Nurse contacted the patient by telephone to perform follow-up assessment. Verified name and  with patient as identifiers. Patient has following risk factors of: acute respiratory failure. Symptoms reviewed with patient who verbalized the following symptoms: no new symptoms  no worsening symptoms. Due to no new or worsening symptoms encounter was not routed to provider for escalation. Educated patient about risk for severe COVID-19 due to risk factors according to CDC guidelines. CTN reviewed discharge instructions, medical action plan and red flag symptoms with the patient who verbalized understanding. Discussed COVID vaccination status: patient reports vaccinated. Education provided on COVID-19 vaccination as appropriate. Discussed exposure protocols and quarantine with CDC Guidelines. Patient was given an opportunity to verbalize any questions and concerns and agrees to contact CTN or health care provider for questions related to their healthcare. Was patient discharged with a pulse oximeter? no    CTN provided contact information. Plan for follow-up call in 5-7 days based on severity of symptoms and risk factors.       Care Transitions Subsequent and Final Call    Schedule Follow Up Appointment with PCP: Completed  Subsequent and Final Calls  Do you have any ongoing symptoms?: No  Have your medications changed?: No  Do you have any questions related to your medications?: No  Do you currently have any active services?: No  Do you have any needs or concerns that I can assist you with?: No     Goals Addressed                   This Visit's Progress     Returns to baseline activity level. On track     Patient/Family able to obtain medicine after d/c     Patient/Family able to verbalize medicine changes     Patient/Family are aware and attends follow up appointments s/p d/c.                  Follow Up  Future Appointments   Date Time Provider Isabela Antony   8/29/2022  8:15 AM [facility] NUCLEAR STRESS [facility] AMB   10/14/2022  3:30 PM Kulwinder Ward MD [facility]       Addie Roberts RN

## 2022-08-23 LAB
O+P SPEC MICRO: NORMAL
O+P STL CONC: NORMAL
SPECIMEN SOURCE: NORMAL

## 2022-08-29 ENCOUNTER — CARE COORDINATION (OUTPATIENT)
Dept: CARE COORDINATION | Facility: CLINIC | Age: 67
End: 2022-08-29

## 2022-08-29 NOTE — CARE COORDINATION
[facility] Transitions Follow Up Call    2022    Patient: Octavio Anderson  Patient : 1955   MRN: [id number]  Reason for Admission: Stroke like symptoms, Acute respiratory failure due to COVID-19  Discharge Date: 22 RARS: Readmission Risk Score: 13.1    Patient contacted regarding COVID-19 diagnosis. Discussed COVID-19 related testing which was available at this time. Test results were positive. Patient informed of results, if available? Yes    Care Transition Nurse contacted the patient by telephone to perform follow-up assessment. Verified name and  with patient as identifiers. Patient has following risk factors of: acute respiratory failure. Symptoms reviewed with patient who verbalized the following symptoms: no new symptoms  no worsening symptoms. Due to no new or worsening symptoms encounter was not routed to provider for escalation. Educated patient about risk for severe COVID-19 due to risk factors according to CDC guidelines. CTN reviewed discharge instructions, medical action plan and red flag symptoms with the patient who verbalized understanding. Discussed COVID vaccination status: Patient reports vaccinated. Education provided on COVID-19 vaccination as appropriate. Discussed exposure protocols and quarantine with CDC Guidelines. Patient was given an opportunity to verbalize any questions and concerns and agrees to contact CTN or health care provider for questions related to their healthcare. Was patient discharged with a pulse oximeter? no    CTN provided contact information. No further follow-up call identified based on severity of symptoms and risk factors.       Care Transitions Subsequent and Final Call    Schedule Follow Up Appointment with PCP: Completed  Subsequent and Final Calls  Do you have any ongoing symptoms?: No  Have your medications changed?: No  Do you have any questions related to your medications?: No  Do you currently have any active services?: No  Do you have any needs or concerns that I can assist you with?: No  Care Transitions Interventions   Goals Addressed                   This Visit's Progress     COMPLETED: Returns to baseline activity level. On track     Patient/Family able to obtain medicine after d/c     Patient/Family able to verbalize medicine changes     Patient/Family are aware and attends follow up appointments s/p d/c.                  Follow Up  Future Appointments   Date Time Provider Isabela Antony   9/7/2022  8:00 AM [facility] NUCLEAR STRESS [facility] AMB   10/14/2022  3:30 PM Karime Mosley MD [facility] ALYSHA Acuna RN

## 2022-10-14 ENCOUNTER — OFFICE VISIT (OUTPATIENT)
Dept: CARDIOLOGY CLINIC | Age: 67
End: 2022-10-14
Payer: MEDICARE

## 2022-10-14 VITALS
BODY MASS INDEX: 29.98 KG/M2 | SYSTOLIC BLOOD PRESSURE: 112 MMHG | WEIGHT: 191 LBS | HEART RATE: 72 BPM | HEIGHT: 67 IN | DIASTOLIC BLOOD PRESSURE: 72 MMHG

## 2022-10-14 DIAGNOSIS — E78.2 MIXED HYPERLIPIDEMIA: ICD-10-CM

## 2022-10-14 DIAGNOSIS — R29.90 STROKE-LIKE SYMPTOMS: ICD-10-CM

## 2022-10-14 DIAGNOSIS — U07.1 COVID: ICD-10-CM

## 2022-10-14 DIAGNOSIS — I10 HYPERTENSION, BENIGN: Primary | ICD-10-CM

## 2022-10-14 PROCEDURE — G8427 DOCREV CUR MEDS BY ELIG CLIN: HCPCS | Performed by: INTERNAL MEDICINE

## 2022-10-14 PROCEDURE — 99213 OFFICE O/P EST LOW 20 MIN: CPT | Performed by: INTERNAL MEDICINE

## 2022-10-14 PROCEDURE — G8484 FLU IMMUNIZE NO ADMIN: HCPCS | Performed by: INTERNAL MEDICINE

## 2022-10-14 PROCEDURE — 1090F PRES/ABSN URINE INCON ASSESS: CPT | Performed by: INTERNAL MEDICINE

## 2022-10-14 PROCEDURE — G8417 CALC BMI ABV UP PARAM F/U: HCPCS | Performed by: INTERNAL MEDICINE

## 2022-10-14 PROCEDURE — G8400 PT W/DXA NO RESULTS DOC: HCPCS | Performed by: INTERNAL MEDICINE

## 2022-10-14 PROCEDURE — 3017F COLORECTAL CA SCREEN DOC REV: CPT | Performed by: INTERNAL MEDICINE

## 2022-10-14 PROCEDURE — 1123F ACP DISCUSS/DSCN MKR DOCD: CPT | Performed by: INTERNAL MEDICINE

## 2022-10-14 PROCEDURE — 1036F TOBACCO NON-USER: CPT | Performed by: INTERNAL MEDICINE

## 2022-10-14 RX ORDER — VALSARTAN 320 MG/1
320 TABLET ORAL DAILY
COMMUNITY

## 2022-10-14 ASSESSMENT — ENCOUNTER SYMPTOMS: SHORTNESS OF BREATH: 0

## 2022-10-14 NOTE — PROGRESS NOTES
[address], [address] [facility], [address] [place] [address]  PHONE: [phone]    Erna Delgado  1955      SUBJECTIVE:   Erna Delgado is a 78 y.o. female seen for a follow up visit regarding the following:     Chief Complaint   Patient presents with    Hypertension       HPI:    71-year-old female comes back for follow-up of her monitor and her nuclear study. She been in the hospital with strokelike symptoms had extensive work-up did not find any abnormality echo showed normal function with no shunting. Sent back to me for 30-day event monitor. We did that and she had some sinus rhythm occasional PVC no atrial fibrillation detected. Her nuclear study showed normal perfusion. She has been stable since her last visit with no symptoms      Past Medical History, Past Surgical History, Family history, Social History, and Medications were all reviewed with the patient today and updated as necessary. Allergies   Allergen Reactions    Codeine Myalgia     Past Medical History:   Diagnosis Date    Chronic right-sided low back pain with right-sided sciatica 8/24/2020    Diabetic polyneuropathy associated with type 2 diabetes mellitus ([address] 8/24/2020    Diabetic sensory polyneuropathy ([address] 11/18/2020    Hip pain 11/18/2020    Mild intermittent asthma      Past Surgical History:   Procedure Laterality Date    CHOLECYSTECTOMY      COLONOSCOPY      HYSTERECTOMY (CERVIX STATUS UNKNOWN)       Family History   Problem Relation Age of Onset    Diabetes Mother     Hypertension Father     Colon Cancer Father     Heart Disease Father     Diabetes Sister     Colon Polyps Sister     Colon Cancer Brother     Diabetes Brother     Colon Cancer Maternal Grandmother     Colon Cancer Maternal Grandfather       Social History     Tobacco Use    Smoking status: Never    Smokeless tobacco: Never   Substance Use Topics    Alcohol use: Never       ROS:    Review of Systems   Constitutional: Negative for decreased appetite. Cardiovascular:  Negative for chest pain, dyspnea on exertion and irregular heartbeat. Respiratory:  Negative for shortness of breath. PHYSICAL EXAM:    /72   Pulse 72   Ht 5' 7\" (1.702 m)   Wt 191 lb (86.6 kg)   BMI 29.91 kg/m²        Wt Readings from Last 3 Encounters:   10/14/22 191 lb (86.6 kg)   09/07/22 193 lb (87.5 kg)   08/19/22 193 lb (87.5 kg)     BP Readings from Last 3 Encounters:   10/14/22 112/72   09/07/22 (!) 150/90   08/19/22 124/78         Physical Exam  Constitutional:       General: She is not in acute distress. Cardiovascular:      Rate and Rhythm: Normal rate and regular rhythm. Heart sounds: No murmur heard. Musculoskeletal:         General: No swelling. Neurological:      Mental Status: She is alert. Medical problems and test results were reviewed with the patient today. No results found for any visits on 10/14/22. Lab Results   Component Value Date/Time     08/13/2022 03:49 AM    K 4.1 08/13/2022 03:49 AM     08/13/2022 03:49 AM    CO2 29 08/13/2022 03:49 AM    BUN 13 08/13/2022 03:49 AM    GFRAA >60 08/13/2022 03:49 AM     Lab Results   Component Value Date/Time    CHOL 159 08/11/2022 04:40 AM    HDL 49 08/11/2022 04:40 AM         ASSESSMENT and PLAN    Kathy Driscoll was seen today for hypertension. Diagnoses and all orders for this visit:    Hypertension, benign currently stable on her valsartan Cardura and verapamil    Stroke-like symptoms she had no further symptoms. We did a 30-day event monitor did not show any atrial fibrillation to account for her events. Mixed hyperlipidemia she will stay on Lipitor at this time    COVID during that time of the strokelike symptoms she did have COVID a nuclear study at this time shows no ischemia. Overall she can follow-up with her family doctor we will be glad to see her back as needed    [unfilled]      No follow-up provider specified.     Irene Larson MD  10/14/2022  6:15 PM

## 2022-12-08 ENCOUNTER — OFFICE VISIT (OUTPATIENT)
Dept: ENT CLINIC | Age: 67
End: 2022-12-08
Payer: MEDICARE

## 2022-12-08 ENCOUNTER — OFFICE VISIT (OUTPATIENT)
Dept: ENT CLINIC | Age: 67
End: 2022-12-08

## 2022-12-08 VITALS
HEIGHT: 67 IN | WEIGHT: 190 LBS | DIASTOLIC BLOOD PRESSURE: 82 MMHG | SYSTOLIC BLOOD PRESSURE: 110 MMHG | BODY MASS INDEX: 29.82 KG/M2

## 2022-12-08 DIAGNOSIS — H90.3 SENSORINEURAL HEARING LOSS, BILATERAL: Primary | ICD-10-CM

## 2022-12-08 DIAGNOSIS — H90.3 SENSORINEURAL HEARING LOSS (SNHL) OF BOTH EARS: ICD-10-CM

## 2022-12-08 DIAGNOSIS — H93.13 TINNITUS OF BOTH EARS: Primary | ICD-10-CM

## 2022-12-08 DIAGNOSIS — H61.22 EXCESSIVE EAR WAX, LEFT: ICD-10-CM

## 2022-12-08 PROCEDURE — 3017F COLORECTAL CA SCREEN DOC REV: CPT | Performed by: STUDENT IN AN ORGANIZED HEALTH CARE EDUCATION/TRAINING PROGRAM

## 2022-12-08 PROCEDURE — G8484 FLU IMMUNIZE NO ADMIN: HCPCS | Performed by: STUDENT IN AN ORGANIZED HEALTH CARE EDUCATION/TRAINING PROGRAM

## 2022-12-08 PROCEDURE — G8417 CALC BMI ABV UP PARAM F/U: HCPCS | Performed by: STUDENT IN AN ORGANIZED HEALTH CARE EDUCATION/TRAINING PROGRAM

## 2022-12-08 PROCEDURE — 69210 REMOVE IMPACTED EAR WAX UNI: CPT | Performed by: STUDENT IN AN ORGANIZED HEALTH CARE EDUCATION/TRAINING PROGRAM

## 2022-12-08 PROCEDURE — 3078F DIAST BP <80 MM HG: CPT | Performed by: STUDENT IN AN ORGANIZED HEALTH CARE EDUCATION/TRAINING PROGRAM

## 2022-12-08 PROCEDURE — 99204 OFFICE O/P NEW MOD 45 MIN: CPT | Performed by: STUDENT IN AN ORGANIZED HEALTH CARE EDUCATION/TRAINING PROGRAM

## 2022-12-08 PROCEDURE — 3074F SYST BP LT 130 MM HG: CPT | Performed by: STUDENT IN AN ORGANIZED HEALTH CARE EDUCATION/TRAINING PROGRAM

## 2022-12-08 PROCEDURE — 1123F ACP DISCUSS/DSCN MKR DOCD: CPT | Performed by: STUDENT IN AN ORGANIZED HEALTH CARE EDUCATION/TRAINING PROGRAM

## 2022-12-08 PROCEDURE — G8427 DOCREV CUR MEDS BY ELIG CLIN: HCPCS | Performed by: STUDENT IN AN ORGANIZED HEALTH CARE EDUCATION/TRAINING PROGRAM

## 2022-12-08 PROCEDURE — 1090F PRES/ABSN URINE INCON ASSESS: CPT | Performed by: STUDENT IN AN ORGANIZED HEALTH CARE EDUCATION/TRAINING PROGRAM

## 2022-12-08 PROCEDURE — G8400 PT W/DXA NO RESULTS DOC: HCPCS | Performed by: STUDENT IN AN ORGANIZED HEALTH CARE EDUCATION/TRAINING PROGRAM

## 2022-12-08 PROCEDURE — 1036F TOBACCO NON-USER: CPT | Performed by: STUDENT IN AN ORGANIZED HEALTH CARE EDUCATION/TRAINING PROGRAM

## 2022-12-08 ASSESSMENT — ENCOUNTER SYMPTOMS
ABDOMINAL PAIN: 0
EYE DISCHARGE: 0
COUGH: 0

## 2022-12-08 NOTE — PROGRESS NOTES
[facility] had Tympanometry and Audiometry performed today. The patient reports tinnitus. Results as follows:    Tympanometry    Type A -  bilaterally    Audiometry    Test Performed - Comprehensive Audiogram    Type of Loss - Right Ear: abnormal hearing: degree of loss is normal to moderately severe sensorineural hearing loss                          Left Ear: abnormal hearing: degree of loss is normal to moderately severe sensorineural hearing loss     SRT   Measurement Right Ear Left Ear   Value 35 25   Unit dB dB     Discrimination  Measurement Right Ear Left Ear   Value 76% 84%   Unit dB dB     Recommend  Binaural amplification and annual audios    [address] [facility]  Audiologist

## 2022-12-08 NOTE — PROGRESS NOTES
[facility] ENT Office Note    Patient: Ewa Gaytan  MRN: [id number]  : 1955  Gender:  female  Vital Signs: /82 (Site: Left Upper Arm, Position: Sitting)   Ht 5' 7\" (1.702 m)   Wt 190 lb (86.2 kg)   BMI 29.76 kg/m²   Date: 2022    CC:   Chief Complaint   Patient presents with    Hearing Problem    Tinnitus     Patient here for tinnitus. New Patient       HPI:  Ewa Gaytan is a 78 y.o. female who endorses right-sided tinnitus for over a year. She endorses a slight hearing loss but still feels she hears well enough overall. She endorses occasional headaches. She denies any dizziness. Past Medical History, Past Surgical History, Family history, Social History, and Medications were all reviewed with the patient today and updated as necessary. Allergies   Allergen Reactions    Codeine Myalgia     Patient Active Problem List   Diagnosis    Diabetic sensory polyneuropathy (HCC)    Chronic right-sided low back pain with right-sided sciatica    Hip pain    Stroke-like symptoms    Cervical disc disorder at C5-C6 level with radiculopathy    DDD (degenerative disc disease), lumbar    Degeneration of thoracolumbar intervertebral disc    Hypertension, benign    Mixed hyperlipidemia    Uncontrolled type 2 diabetes mellitus with hyperglycemia (HCC)    Class 1 obesity due to excess calories with serious comorbidity and body mass index (BMI) of 31.0 to 31.9 in adult    COVID    Acute renal failure superimposed on stage 3a chronic kidney disease (HCC)    Precordial pain     Current Outpatient Medications   Medication Sig    valsartan (DIOVAN) 320 MG tablet Take 320 mg by mouth daily    doxazosin (CARDURA) 1 MG tablet Take 1 mg by mouth nightly    aspirin 81 MG EC tablet Take 1 tablet by mouth in the morning.     atorvastatin (LIPITOR) 10 MG tablet Take 10 mg by mouth daily    Cholecalciferol 75 MCG (3000 UT) TABS Take by mouth    gabapentin (NEURONTIN) 600 MG tablet Take 600 mg by mouth 3 times daily.    metFORMIN (GLUCOPHAGE) 1000 MG tablet Take 1,000 mg by mouth in the morning and 1,000 mg at noon and 1,000 mg in the evening. Take with meals. Per Patient List.    verapamil (VERELAN) 240 MG extended release capsule Take 240 mg by mouth daily     No current facility-administered medications for this visit. Past Medical History:   Diagnosis Date    Chronic right-sided low back pain with right-sided sciatica 8/24/2020    Diabetic polyneuropathy associated with type 2 diabetes mellitus ([facility] 8/24/2020    Diabetic sensory polyneuropathy ([facility] 11/18/2020    Hip pain 11/18/2020    Mild intermittent asthma      Social History     Tobacco Use    Smoking status: Never    Smokeless tobacco: Never   Substance Use Topics    Alcohol use: Never     Past Surgical History:   Procedure Laterality Date    CHOLECYSTECTOMY      COLONOSCOPY      HYSTERECTOMY (CERVIX STATUS UNKNOWN)       Family History   Problem Relation Age of Onset    Diabetes Mother     Hypertension Father     Colon Cancer Father     Heart Disease Father     Diabetes Sister     Colon Polyps Sister     Colon Cancer Brother     Diabetes Brother     Colon Cancer Maternal Grandmother     Colon Cancer Maternal Grandfather         ROS:    Review of Systems   Constitutional:  Negative for fever. HENT:  Positive for tinnitus. Negative for ear discharge and ear pain. Eyes:  Negative for discharge. Respiratory:  Negative for cough. Cardiovascular:  Negative for chest pain. Gastrointestinal:  Negative for abdominal pain. Genitourinary:  Negative for difficulty urinating. Musculoskeletal:  Negative for neck pain. Skin:  Negative for rash. Allergic/Immunologic: Negative for environmental allergies. Neurological:  Negative for dizziness. Hematological:  Negative for adenopathy. Psychiatric/Behavioral:  Negative for agitation.          PHYSICAL EXAM:    /82 (Site: Left Upper Arm, Position: Sitting)   Ht 5' 7\" (1.702 m)   Wt 190 lb (86.2 kg)   BMI 29.76 kg/m²     General: NAD, well-appearing  Neuro: No gross neuro deficits. CN's II-XII intact. No facial weakness. Eyes: EOMI. Pupils reactive. No periorbital edema/ecchymosis. Skin: No facial erythema, rashes or concerning lesions. Nose: No external deviations or saddling. Intranasally, septum is midline without perforations, nasal mucosa appears healthy with no erythema, mucopurulence, or polyps. Mouth: Moist mucus membranes, normal tongue/palate mobility, no concerning mucosal lesions. Oropharynx: clear with no erythema/exudate, no tonsillar hypertrophy. Ears: Normal appearing auricles, no hematomas. EACs with left-sided cerumen impaction, healthy canal skin, TM's intact with no perforations or retraction pockets. No middle ear effusions. Neck: Soft, supple, no palpable lateral neck masses. No parotid or submandibular masses. No thyromegaly or palpable thyroid nodules. No surgical scars. Lymphatics: No palpable cervical LAD. Resp/Lungs: No audible stridor or wheezing, CTAB  Heart: RRR  Extremities: No clubbing or cyanosis. PROCEDURE: Cerumen removal under binocular microscopy  INDICATIONS: Cerumen impaction  DESCRIPTION: After verbal consent was obtained and a timeout was performed, the otologic microscope was used to visualize both ears. There were normal appearing auricles bilaterally. There was cerumen impaction on the left. I cleaned out the left ear under the scope w/ a right angle hook and otologic suctions. After cleaning, the ear canal skin was healthy and both TMs were intact w/ no perforations. Both middle ear spaces were clear w/ no effusions. The patient tolerated the procedure well and there were no complications.     Lab Results   Component Value Date    WBC 9.3 08/12/2022    HGB 11.0 (L) 08/12/2022    HCT 33.1 (L) 08/12/2022    MCV 82.1 08/12/2022     08/12/2022     Lab Results   Component Value Date/Time     08/13/2022 03:49 AM    K 4.1 08/13/2022 03:49 AM     08/13/2022 03:49 AM    CO2 29 08/13/2022 03:49 AM    BUN 13 08/13/2022 03:49 AM    CREATININE 0.90 08/13/2022 03:49 AM    GLUCOSE 112 08/13/2022 03:49 AM    CALCIUM 8.9 08/13/2022 03:49 AM      MRI Result (most recent):  MRI BRAIN W WO CONTRAST 08/12/2022    Narrative  MRI brain with and without contrast    History: Confusion, right-sided weakness beginning yesterday. Difficulty walking    Imaging sequences: Multiplanar multisequence imaging was performed on a 1.5  Lorna magnet, utilizing the uneventful administration of 20 mL of intravenous  ProHance in order to better evaluate for intracranial pathology. Comparison: None. Correlation is made to the CT scan of the brain 08/10/2022. Findings: The examination is mildly compromised due to motion. The ventricles  and sulci are normal in size and configuration. There are no extra-axial fluid  collections. Normal flow voids are present within all of the major intracranial  vessels. No evidence of intraparenchymal hemorrhage or mass effect is  identified. .  There are no areas of restricted diffusion to suggest an acute or  subacute infarction. There is a tiny remote right cerebellar infarction. No  abnormal areas of enhancement are identified following contrast administration. The visualized mastoid air cells and paranasal sinuses are well pneumatized and  aerated. Impression  1. Mildly compromised examination without evidence of acute infarction. 2. Tiny remote right cerebellar infarction. ASSESSMENT and PLAN  79-year-old female with bilateral sensorineural hearing loss and associated tinnitus. I recommended hearing aids. She has a contract with an outside hearing aid provider through her insurance company she will contact. She can follow back up as needed. ICD-10-CM    1. Tinnitus of both ears  H93.13       2.  Excessive ear wax, left  H61.22 REMOVAL OF IMPACTED WAX MD Polo Quesada MD  12/8/2022  Electronically signed    Note dictated using voice recognition software. Please excuse any typos.

## 2024-09-14 ENCOUNTER — HOSPITAL ENCOUNTER (OUTPATIENT)
Age: 69
Setting detail: OBSERVATION
Discharge: HOME | End: 2024-09-16
Attending: STUDENT IN AN ORGANIZED HEALTH CARE EDUCATION/TRAINING PROGRAM | Admitting: STUDENT IN AN ORGANIZED HEALTH CARE EDUCATION/TRAINING PROGRAM
Payer: MEDICARE

## 2024-09-14 DIAGNOSIS — R09.02 HYPOXEMIA: ICD-10-CM

## 2024-09-14 DIAGNOSIS — N17.9 AKI (ACUTE KIDNEY INJURY) (HCC): Primary | ICD-10-CM

## 2024-09-14 PROCEDURE — 99285 EMERGENCY DEPT VISIT HI MDM: CPT

## 2024-09-14 PROCEDURE — 93005 ELECTROCARDIOGRAM TRACING: CPT | Performed by: EMERGENCY MEDICINE

## 2024-09-14 ASSESSMENT — PAIN - FUNCTIONAL ASSESSMENT: PAIN_FUNCTIONAL_ASSESSMENT: NONE - DENIES PAIN

## 2024-09-14 ASSESSMENT — LIFESTYLE VARIABLES
HOW MANY STANDARD DRINKS CONTAINING ALCOHOL DO YOU HAVE ON A TYPICAL DAY: PATIENT DOES NOT DRINK
HOW OFTEN DO YOU HAVE A DRINK CONTAINING ALCOHOL: NEVER

## 2024-09-15 ENCOUNTER — APPOINTMENT (OUTPATIENT)
Dept: GENERAL RADIOLOGY | Age: 69
End: 2024-09-15
Payer: MEDICARE

## 2024-09-15 ENCOUNTER — APPOINTMENT (OUTPATIENT)
Dept: CT IMAGING | Age: 69
End: 2024-09-15
Payer: MEDICARE

## 2024-09-15 PROBLEM — N17.9 AKI (ACUTE KIDNEY INJURY) (HCC): Status: ACTIVE | Noted: 2024-09-15

## 2024-09-15 PROBLEM — G93.40 ACUTE ENCEPHALOPATHY: Status: ACTIVE | Noted: 2024-09-15

## 2024-09-15 LAB
ALBUMIN SERPL-MCNC: 3.5 G/DL (ref 3.2–4.6)
ALBUMIN SERPL-MCNC: 3.5 G/DL (ref 3.2–4.6)
ALBUMIN/GLOB SERPL: 1.1 (ref 1–1.9)
ALBUMIN/GLOB SERPL: 1.4 (ref 1–1.9)
ALP SERPL-CCNC: 77 U/L (ref 35–104)
ALP SERPL-CCNC: 79 U/L (ref 35–104)
ALT SERPL-CCNC: 14 U/L (ref 12–65)
ALT SERPL-CCNC: 15 U/L (ref 12–65)
ANION GAP SERPL CALC-SCNC: 10 MMOL/L (ref 9–18)
ANION GAP SERPL CALC-SCNC: 11 MMOL/L (ref 9–18)
APPEARANCE UR: CLEAR
AST SERPL-CCNC: 19 U/L (ref 15–37)
AST SERPL-CCNC: 21 U/L (ref 15–37)
B PERT DNA SPEC QL NAA+PROBE: NOT DETECTED
BACTERIA URNS QL MICRO: NORMAL /HPF
BASOPHILS # BLD: 0 K/UL (ref 0–0.2)
BASOPHILS NFR BLD: 0 % (ref 0–2)
BILIRUB DIRECT SERPL-MCNC: <0.2 MG/DL (ref 0–0.4)
BILIRUB SERPL-MCNC: 0.6 MG/DL (ref 0–1.2)
BILIRUB SERPL-MCNC: 0.6 MG/DL (ref 0–1.2)
BILIRUB UR QL: NEGATIVE
BORDETELLA PARAPERTUSSIS BY PCR: NOT DETECTED
BUN SERPL-MCNC: 24 MG/DL (ref 8–23)
BUN SERPL-MCNC: 27 MG/DL (ref 8–23)
C PNEUM DNA SPEC QL NAA+PROBE: NOT DETECTED
CALCIUM SERPL-MCNC: 8.6 MG/DL (ref 8.8–10.2)
CALCIUM SERPL-MCNC: 9 MG/DL (ref 8.8–10.2)
CASTS URNS QL MICRO: NORMAL /LPF
CHLORIDE SERPL-SCNC: 97 MMOL/L (ref 98–107)
CHLORIDE SERPL-SCNC: 98 MMOL/L (ref 98–107)
CO2 SERPL-SCNC: 27 MMOL/L (ref 20–28)
CO2 SERPL-SCNC: 28 MMOL/L (ref 20–28)
COLOR UR: NORMAL
CREAT SERPL-MCNC: 1.39 MG/DL (ref 0.6–1.1)
CREAT SERPL-MCNC: 1.69 MG/DL (ref 0.6–1.1)
CRYSTALS URNS QL MICRO: 0 /LPF
DIFFERENTIAL METHOD BLD: ABNORMAL
EKG ATRIAL RATE: 102 BPM
EKG DIAGNOSIS: NORMAL
EKG P AXIS: 89 DEGREES
EKG P-R INTERVAL: 143 MS
EKG Q-T INTERVAL: 347 MS
EKG QRS DURATION: 77 MS
EKG QTC CALCULATION (BAZETT): 448 MS
EKG R AXIS: 69 DEGREES
EKG T AXIS: 42 DEGREES
EKG VENTRICULAR RATE: 100 BPM
EOSINOPHIL # BLD: 0.2 K/UL (ref 0–0.8)
EOSINOPHIL NFR BLD: 3 % (ref 0.5–7.8)
EPI CELLS #/AREA URNS HPF: 0 /HPF
ERYTHROCYTE [DISTWIDTH] IN BLOOD BY AUTOMATED COUNT: 13.6 % (ref 11.9–14.6)
FLUAV SUBTYP SPEC NAA+PROBE: NOT DETECTED
FLUBV RNA SPEC QL NAA+PROBE: NOT DETECTED
GLOBULIN SER CALC-MCNC: 2.6 G/DL (ref 2.3–3.5)
GLOBULIN SER CALC-MCNC: 3 G/DL (ref 2.3–3.5)
GLUCOSE BLD STRIP.AUTO-MCNC: 181 MG/DL (ref 65–100)
GLUCOSE BLD STRIP.AUTO-MCNC: 189 MG/DL (ref 65–100)
GLUCOSE BLD STRIP.AUTO-MCNC: 251 MG/DL (ref 65–100)
GLUCOSE BLD STRIP.AUTO-MCNC: 313 MG/DL (ref 65–100)
GLUCOSE SERPL-MCNC: 168 MG/DL (ref 70–99)
GLUCOSE SERPL-MCNC: 215 MG/DL (ref 70–99)
GLUCOSE UR STRIP.AUTO-MCNC: NEGATIVE MG/DL
HADV DNA SPEC QL NAA+PROBE: NOT DETECTED
HCOV 229E RNA SPEC QL NAA+PROBE: NOT DETECTED
HCOV HKU1 RNA SPEC QL NAA+PROBE: NOT DETECTED
HCOV NL63 RNA SPEC QL NAA+PROBE: NOT DETECTED
HCOV OC43 RNA SPEC QL NAA+PROBE: NOT DETECTED
HCT VFR BLD AUTO: 35.5 % (ref 35.8–46.3)
HGB BLD-MCNC: 11.5 G/DL (ref 11.7–15.4)
HGB UR QL STRIP: NEGATIVE
HMPV RNA SPEC QL NAA+PROBE: NOT DETECTED
HPIV1 RNA SPEC QL NAA+PROBE: NOT DETECTED
HPIV2 RNA SPEC QL NAA+PROBE: NOT DETECTED
HPIV3 RNA SPEC QL NAA+PROBE: NOT DETECTED
HPIV4 RNA SPEC QL NAA+PROBE: NOT DETECTED
IMM GRANULOCYTES # BLD AUTO: 0 K/UL (ref 0–0.5)
IMM GRANULOCYTES NFR BLD AUTO: 0 % (ref 0–5)
KETONES UR QL STRIP.AUTO: NEGATIVE MG/DL
LACTATE SERPL-SCNC: 1.6 MMOL/L (ref 0.5–2)
LEUKOCYTE ESTERASE UR QL STRIP.AUTO: NEGATIVE
LIPASE SERPL-CCNC: 27 U/L (ref 13–60)
LYMPHOCYTES # BLD: 1.7 K/UL (ref 0.5–4.6)
LYMPHOCYTES NFR BLD: 19 % (ref 13–44)
M PNEUMO DNA SPEC QL NAA+PROBE: NOT DETECTED
MCH RBC QN AUTO: 26.8 PG (ref 26.1–32.9)
MCHC RBC AUTO-ENTMCNC: 32.4 G/DL (ref 31.4–35)
MCV RBC AUTO: 82.8 FL (ref 82–102)
MONOCYTES # BLD: 0.6 K/UL (ref 0.1–1.3)
MONOCYTES NFR BLD: 6 % (ref 4–12)
MUCOUS THREADS URNS QL MICRO: 0 /LPF
NEUTS SEG # BLD: 6.7 K/UL (ref 1.7–8.2)
NEUTS SEG NFR BLD: 72 % (ref 43–78)
NITRITE UR QL STRIP.AUTO: NEGATIVE
NRBC # BLD: 0 K/UL (ref 0–0.2)
NT PRO BNP: <36 PG/ML (ref 0–125)
OTHER OBSERVATIONS: NORMAL
PH UR STRIP: 5 (ref 5–9)
PLATELET # BLD AUTO: 224 K/UL (ref 150–450)
PMV BLD AUTO: 10.4 FL (ref 9.4–12.3)
POTASSIUM SERPL-SCNC: 3.9 MMOL/L (ref 3.5–5.1)
POTASSIUM SERPL-SCNC: 4.3 MMOL/L (ref 3.5–5.1)
PROCALCITONIN SERPL-MCNC: 0.09 NG/ML (ref 0–0.1)
PROT SERPL-MCNC: 6.1 G/DL (ref 6.3–8.2)
PROT SERPL-MCNC: 6.5 G/DL (ref 6.3–8.2)
PROT UR STRIP-MCNC: NEGATIVE MG/DL
RBC # BLD AUTO: 4.29 M/UL (ref 4.05–5.2)
RBC #/AREA URNS HPF: 0 /HPF
RSV RNA SPEC QL NAA+PROBE: NOT DETECTED
RV+EV RNA SPEC QL NAA+PROBE: NOT DETECTED
SARS-COV-2 RDRP RESP QL NAA+PROBE: NOT DETECTED
SARS-COV-2 RNA RESP QL NAA+PROBE: NOT DETECTED
SERVICE CMNT-IMP: ABNORMAL
SODIUM SERPL-SCNC: 135 MMOL/L (ref 136–145)
SODIUM SERPL-SCNC: 136 MMOL/L (ref 136–145)
SOURCE: NORMAL
SP GR UR REFRACTOMETRY: 1.01 (ref 1–1.02)
URINE CULTURE IF INDICATED: NORMAL
UROBILINOGEN UR QL STRIP.AUTO: 0.2 EU/DL (ref 0.2–1)
WBC # BLD AUTO: 9.3 K/UL (ref 4.3–11.1)
WBC URNS QL MICRO: 0 /HPF

## 2024-09-15 PROCEDURE — 6370000000 HC RX 637 (ALT 250 FOR IP): Performed by: INTERNAL MEDICINE

## 2024-09-15 PROCEDURE — 71045 X-RAY EXAM CHEST 1 VIEW: CPT

## 2024-09-15 PROCEDURE — 87635 SARS-COV-2 COVID-19 AMP PRB: CPT

## 2024-09-15 PROCEDURE — 82962 GLUCOSE BLOOD TEST: CPT

## 2024-09-15 PROCEDURE — 80076 HEPATIC FUNCTION PANEL: CPT

## 2024-09-15 PROCEDURE — 83690 ASSAY OF LIPASE: CPT

## 2024-09-15 PROCEDURE — 83605 ASSAY OF LACTIC ACID: CPT

## 2024-09-15 PROCEDURE — G0378 HOSPITAL OBSERVATION PER HR: HCPCS

## 2024-09-15 PROCEDURE — 81001 URINALYSIS AUTO W/SCOPE: CPT

## 2024-09-15 PROCEDURE — 87040 BLOOD CULTURE FOR BACTERIA: CPT

## 2024-09-15 PROCEDURE — 0202U NFCT DS 22 TRGT SARS-COV-2: CPT

## 2024-09-15 PROCEDURE — 2700000000 HC OXYGEN THERAPY PER DAY

## 2024-09-15 PROCEDURE — 6370000000 HC RX 637 (ALT 250 FOR IP): Performed by: STUDENT IN AN ORGANIZED HEALTH CARE EDUCATION/TRAINING PROGRAM

## 2024-09-15 PROCEDURE — 2580000003 HC RX 258: Performed by: STUDENT IN AN ORGANIZED HEALTH CARE EDUCATION/TRAINING PROGRAM

## 2024-09-15 PROCEDURE — 6360000004 HC RX CONTRAST MEDICATION: Performed by: STUDENT IN AN ORGANIZED HEALTH CARE EDUCATION/TRAINING PROGRAM

## 2024-09-15 PROCEDURE — 96372 THER/PROPH/DIAG INJ SC/IM: CPT

## 2024-09-15 PROCEDURE — 96360 HYDRATION IV INFUSION INIT: CPT

## 2024-09-15 PROCEDURE — 71260 CT THORAX DX C+: CPT

## 2024-09-15 PROCEDURE — 80053 COMPREHEN METABOLIC PANEL: CPT

## 2024-09-15 PROCEDURE — 93010 ELECTROCARDIOGRAM REPORT: CPT | Performed by: INTERNAL MEDICINE

## 2024-09-15 PROCEDURE — 85025 COMPLETE CBC W/AUTO DIFF WBC: CPT

## 2024-09-15 PROCEDURE — 94760 N-INVAS EAR/PLS OXIMETRY 1: CPT

## 2024-09-15 PROCEDURE — 83880 ASSAY OF NATRIURETIC PEPTIDE: CPT

## 2024-09-15 PROCEDURE — 84145 PROCALCITONIN (PCT): CPT

## 2024-09-15 PROCEDURE — 36415 COLL VENOUS BLD VENIPUNCTURE: CPT

## 2024-09-15 PROCEDURE — 6360000002 HC RX W HCPCS: Performed by: STUDENT IN AN ORGANIZED HEALTH CARE EDUCATION/TRAINING PROGRAM

## 2024-09-15 RX ORDER — HEPARIN SODIUM 5000 [USP'U]/ML
5000 INJECTION, SOLUTION INTRAVENOUS; SUBCUTANEOUS EVERY 8 HOURS SCHEDULED
Status: DISCONTINUED | OUTPATIENT
Start: 2024-09-15 | End: 2024-09-16 | Stop reason: HOSPADM

## 2024-09-15 RX ORDER — OXYCODONE HYDROCHLORIDE 5 MG/1
5 TABLET ORAL EVERY 6 HOURS PRN
Status: DISCONTINUED | OUTPATIENT
Start: 2024-09-15 | End: 2024-09-16 | Stop reason: HOSPADM

## 2024-09-15 RX ORDER — ONDANSETRON 2 MG/ML
4 INJECTION INTRAMUSCULAR; INTRAVENOUS EVERY 6 HOURS PRN
Status: DISCONTINUED | OUTPATIENT
Start: 2024-09-15 | End: 2024-09-16 | Stop reason: HOSPADM

## 2024-09-15 RX ORDER — IOPAMIDOL 755 MG/ML
100 INJECTION, SOLUTION INTRAVASCULAR
Status: COMPLETED | OUTPATIENT
Start: 2024-09-15 | End: 2024-09-15

## 2024-09-15 RX ORDER — GABAPENTIN 100 MG/1
200 CAPSULE ORAL 3 TIMES DAILY
Status: DISCONTINUED | OUTPATIENT
Start: 2024-09-15 | End: 2024-09-16 | Stop reason: HOSPADM

## 2024-09-15 RX ORDER — ONDANSETRON 4 MG/1
4 TABLET, ORALLY DISINTEGRATING ORAL EVERY 8 HOURS PRN
Status: DISCONTINUED | OUTPATIENT
Start: 2024-09-15 | End: 2024-09-16 | Stop reason: HOSPADM

## 2024-09-15 RX ORDER — POTASSIUM CHLORIDE 7.45 MG/ML
10 INJECTION INTRAVENOUS PRN
Status: DISCONTINUED | OUTPATIENT
Start: 2024-09-15 | End: 2024-09-16 | Stop reason: HOSPADM

## 2024-09-15 RX ORDER — DEXTROSE MONOHYDRATE 100 MG/ML
INJECTION, SOLUTION INTRAVENOUS CONTINUOUS PRN
Status: DISCONTINUED | OUTPATIENT
Start: 2024-09-15 | End: 2024-09-16 | Stop reason: HOSPADM

## 2024-09-15 RX ORDER — ENOXAPARIN SODIUM 100 MG/ML
40 INJECTION SUBCUTANEOUS DAILY
Status: DISCONTINUED | OUTPATIENT
Start: 2024-09-15 | End: 2024-09-15 | Stop reason: SDUPTHER

## 2024-09-15 RX ORDER — SENNOSIDES A AND B 8.6 MG/1
2 TABLET, FILM COATED ORAL NIGHTLY PRN
Status: DISCONTINUED | OUTPATIENT
Start: 2024-09-15 | End: 2024-09-16 | Stop reason: HOSPADM

## 2024-09-15 RX ORDER — SODIUM CHLORIDE, SODIUM LACTATE, POTASSIUM CHLORIDE, AND CALCIUM CHLORIDE .6; .31; .03; .02 G/100ML; G/100ML; G/100ML; G/100ML
1000 INJECTION, SOLUTION INTRAVENOUS
Status: COMPLETED | OUTPATIENT
Start: 2024-09-15 | End: 2024-09-15

## 2024-09-15 RX ORDER — PANTOPRAZOLE SODIUM 40 MG/1
40 TABLET, DELAYED RELEASE ORAL
Status: DISCONTINUED | OUTPATIENT
Start: 2024-09-15 | End: 2024-09-16 | Stop reason: HOSPADM

## 2024-09-15 RX ORDER — MORPHINE SULFATE 30 MG/1
30 TABLET ORAL 2 TIMES DAILY
COMMUNITY

## 2024-09-15 RX ORDER — LORAZEPAM 0.5 MG/1
0.5 TABLET ORAL EVERY 6 HOURS PRN
Status: DISCONTINUED | OUTPATIENT
Start: 2024-09-15 | End: 2024-09-16 | Stop reason: HOSPADM

## 2024-09-15 RX ORDER — ATORVASTATIN CALCIUM 10 MG/1
10 TABLET, FILM COATED ORAL DAILY
Status: DISCONTINUED | OUTPATIENT
Start: 2024-09-15 | End: 2024-09-16 | Stop reason: HOSPADM

## 2024-09-15 RX ORDER — MAGNESIUM SULFATE IN WATER 40 MG/ML
2000 INJECTION, SOLUTION INTRAVENOUS PRN
Status: DISCONTINUED | OUTPATIENT
Start: 2024-09-15 | End: 2024-09-16 | Stop reason: HOSPADM

## 2024-09-15 RX ORDER — TRAZODONE HYDROCHLORIDE 50 MG/1
50 TABLET, FILM COATED ORAL NIGHTLY PRN
Status: DISCONTINUED | OUTPATIENT
Start: 2024-09-15 | End: 2024-09-16 | Stop reason: HOSPADM

## 2024-09-15 RX ORDER — MAGNESIUM HYDROXIDE/ALUMINUM HYDROXICE/SIMETHICONE 120; 1200; 1200 MG/30ML; MG/30ML; MG/30ML
30 SUSPENSION ORAL EVERY 6 HOURS PRN
Status: DISCONTINUED | OUTPATIENT
Start: 2024-09-15 | End: 2024-09-16 | Stop reason: HOSPADM

## 2024-09-15 RX ORDER — INSULIN LISPRO 100 [IU]/ML
0-4 INJECTION, SOLUTION INTRAVENOUS; SUBCUTANEOUS
Status: DISCONTINUED | OUTPATIENT
Start: 2024-09-15 | End: 2024-09-16 | Stop reason: HOSPADM

## 2024-09-15 RX ORDER — INSULIN LISPRO 100 [IU]/ML
0-4 INJECTION, SOLUTION INTRAVENOUS; SUBCUTANEOUS NIGHTLY
Status: DISCONTINUED | OUTPATIENT
Start: 2024-09-15 | End: 2024-09-16 | Stop reason: HOSPADM

## 2024-09-15 RX ORDER — ACETAMINOPHEN 325 MG/1
650 TABLET ORAL EVERY 6 HOURS PRN
Status: DISCONTINUED | OUTPATIENT
Start: 2024-09-15 | End: 2024-09-16 | Stop reason: HOSPADM

## 2024-09-15 RX ORDER — POTASSIUM CHLORIDE 1500 MG/1
40 TABLET, EXTENDED RELEASE ORAL PRN
Status: DISCONTINUED | OUTPATIENT
Start: 2024-09-15 | End: 2024-09-16 | Stop reason: HOSPADM

## 2024-09-15 RX ORDER — POLYETHYLENE GLYCOL 3350 17 G/17G
17 POWDER, FOR SOLUTION ORAL DAILY PRN
Status: DISCONTINUED | OUTPATIENT
Start: 2024-09-15 | End: 2024-09-16 | Stop reason: HOSPADM

## 2024-09-15 RX ORDER — LANOLIN ALCOHOL/MO/W.PET/CERES
1.5 CREAM (GRAM) TOPICAL NIGHTLY PRN
Status: DISCONTINUED | OUTPATIENT
Start: 2024-09-15 | End: 2024-09-16 | Stop reason: HOSPADM

## 2024-09-15 RX ORDER — IBUPROFEN 600 MG/1
1 TABLET ORAL PRN
Status: DISCONTINUED | OUTPATIENT
Start: 2024-09-15 | End: 2024-09-16 | Stop reason: HOSPADM

## 2024-09-15 RX ORDER — HYDROMORPHONE HYDROCHLORIDE 1 MG/ML
0.25 INJECTION, SOLUTION INTRAMUSCULAR; INTRAVENOUS; SUBCUTANEOUS EVERY 4 HOURS PRN
Status: DISCONTINUED | OUTPATIENT
Start: 2024-09-15 | End: 2024-09-15

## 2024-09-15 RX ADMIN — ATORVASTATIN CALCIUM 10 MG: 10 TABLET, FILM COATED ORAL at 08:33

## 2024-09-15 RX ADMIN — HEPARIN SODIUM 5000 UNITS: 5000 INJECTION INTRAVENOUS; SUBCUTANEOUS at 21:16

## 2024-09-15 RX ADMIN — IOPAMIDOL 100 ML: 755 INJECTION, SOLUTION INTRAVENOUS at 02:41

## 2024-09-15 RX ADMIN — PANTOPRAZOLE SODIUM 40 MG: 40 TABLET, DELAYED RELEASE ORAL at 13:43

## 2024-09-15 RX ADMIN — SODIUM CHLORIDE, POTASSIUM CHLORIDE, SODIUM LACTATE AND CALCIUM CHLORIDE 1000 ML: 600; 310; 30; 20 INJECTION, SOLUTION INTRAVENOUS at 00:20

## 2024-09-15 RX ADMIN — HEPARIN SODIUM 5000 UNITS: 5000 INJECTION INTRAVENOUS; SUBCUTANEOUS at 05:57

## 2024-09-15 RX ADMIN — INSULIN LISPRO 2 UNITS: 100 INJECTION, SOLUTION INTRAVENOUS; SUBCUTANEOUS at 17:09

## 2024-09-15 RX ADMIN — INSULIN LISPRO 4 UNITS: 100 INJECTION, SOLUTION INTRAVENOUS; SUBCUTANEOUS at 21:15

## 2024-09-15 RX ADMIN — HEPARIN SODIUM 5000 UNITS: 5000 INJECTION INTRAVENOUS; SUBCUTANEOUS at 14:26

## 2024-09-15 RX ADMIN — GABAPENTIN 200 MG: 100 CAPSULE ORAL at 21:18

## 2024-09-15 RX ADMIN — GABAPENTIN 200 MG: 100 CAPSULE ORAL at 14:26

## 2024-09-16 VITALS
BODY MASS INDEX: 32.62 KG/M2 | HEIGHT: 65 IN | RESPIRATION RATE: 18 BRPM | DIASTOLIC BLOOD PRESSURE: 88 MMHG | HEART RATE: 63 BPM | OXYGEN SATURATION: 96 % | WEIGHT: 195.77 LBS | TEMPERATURE: 97.3 F | SYSTOLIC BLOOD PRESSURE: 148 MMHG

## 2024-09-16 LAB
ANION GAP SERPL CALC-SCNC: 8 MMOL/L (ref 9–18)
BASOPHILS # BLD: 0 K/UL (ref 0–0.2)
BASOPHILS NFR BLD: 1 % (ref 0–2)
BUN SERPL-MCNC: 20 MG/DL (ref 8–23)
CALCIUM SERPL-MCNC: 9.1 MG/DL (ref 8.8–10.2)
CHLORIDE SERPL-SCNC: 96 MMOL/L (ref 98–107)
CO2 SERPL-SCNC: 33 MMOL/L (ref 20–28)
CREAT SERPL-MCNC: 1.11 MG/DL (ref 0.6–1.1)
DIFFERENTIAL METHOD BLD: ABNORMAL
EOSINOPHIL # BLD: 0.2 K/UL (ref 0–0.8)
EOSINOPHIL NFR BLD: 3 % (ref 0.5–7.8)
ERYTHROCYTE [DISTWIDTH] IN BLOOD BY AUTOMATED COUNT: 13.4 % (ref 11.9–14.6)
GLUCOSE BLD STRIP.AUTO-MCNC: 227 MG/DL (ref 65–100)
GLUCOSE BLD STRIP.AUTO-MCNC: 242 MG/DL (ref 65–100)
GLUCOSE BLD STRIP.AUTO-MCNC: 298 MG/DL (ref 65–100)
GLUCOSE SERPL-MCNC: 231 MG/DL (ref 70–99)
HCT VFR BLD AUTO: 34.7 % (ref 35.8–46.3)
HGB BLD-MCNC: 11.3 G/DL (ref 11.7–15.4)
IMM GRANULOCYTES # BLD AUTO: 0 K/UL (ref 0–0.5)
IMM GRANULOCYTES NFR BLD AUTO: 0 % (ref 0–5)
LYMPHOCYTES # BLD: 1.9 K/UL (ref 0.5–4.6)
LYMPHOCYTES NFR BLD: 29 % (ref 13–44)
MCH RBC QN AUTO: 27 PG (ref 26.1–32.9)
MCHC RBC AUTO-ENTMCNC: 32.6 G/DL (ref 31.4–35)
MCV RBC AUTO: 82.8 FL (ref 82–102)
MONOCYTES # BLD: 0.6 K/UL (ref 0.1–1.3)
MONOCYTES NFR BLD: 9 % (ref 4–12)
NEUTS SEG # BLD: 3.8 K/UL (ref 1.7–8.2)
NEUTS SEG NFR BLD: 58 % (ref 43–78)
NRBC # BLD: 0 K/UL (ref 0–0.2)
PLATELET # BLD AUTO: 201 K/UL (ref 150–450)
PMV BLD AUTO: 9.9 FL (ref 9.4–12.3)
POTASSIUM SERPL-SCNC: 3.8 MMOL/L (ref 3.5–5.1)
RBC # BLD AUTO: 4.19 M/UL (ref 4.05–5.2)
SERVICE CMNT-IMP: ABNORMAL
SODIUM SERPL-SCNC: 137 MMOL/L (ref 136–145)
WBC # BLD AUTO: 6.5 K/UL (ref 4.3–11.1)

## 2024-09-16 PROCEDURE — 6370000000 HC RX 637 (ALT 250 FOR IP): Performed by: INTERNAL MEDICINE

## 2024-09-16 PROCEDURE — 82962 GLUCOSE BLOOD TEST: CPT

## 2024-09-16 PROCEDURE — 96372 THER/PROPH/DIAG INJ SC/IM: CPT

## 2024-09-16 PROCEDURE — G0378 HOSPITAL OBSERVATION PER HR: HCPCS

## 2024-09-16 PROCEDURE — 36415 COLL VENOUS BLD VENIPUNCTURE: CPT

## 2024-09-16 PROCEDURE — 97161 PT EVAL LOW COMPLEX 20 MIN: CPT

## 2024-09-16 PROCEDURE — 97165 OT EVAL LOW COMPLEX 30 MIN: CPT

## 2024-09-16 PROCEDURE — 6360000002 HC RX W HCPCS: Performed by: STUDENT IN AN ORGANIZED HEALTH CARE EDUCATION/TRAINING PROGRAM

## 2024-09-16 PROCEDURE — 97530 THERAPEUTIC ACTIVITIES: CPT

## 2024-09-16 PROCEDURE — 6370000000 HC RX 637 (ALT 250 FOR IP): Performed by: STUDENT IN AN ORGANIZED HEALTH CARE EDUCATION/TRAINING PROGRAM

## 2024-09-16 PROCEDURE — 85025 COMPLETE CBC W/AUTO DIFF WBC: CPT

## 2024-09-16 PROCEDURE — 97535 SELF CARE MNGMENT TRAINING: CPT

## 2024-09-16 PROCEDURE — 80048 BASIC METABOLIC PNL TOTAL CA: CPT

## 2024-09-16 RX ORDER — GABAPENTIN 600 MG/1
300 TABLET ORAL 3 TIMES DAILY
Qty: 45 TABLET | Refills: 0
Start: 2024-09-16 | End: 2024-10-16

## 2024-09-16 RX ORDER — VALSARTAN AND HYDROCHLOROTHIAZIDE 320; 12.5 MG/1; MG/1
1 TABLET, FILM COATED ORAL DAILY
Status: ON HOLD | COMMUNITY
Start: 2024-07-12 | End: 2024-09-16 | Stop reason: HOSPADM

## 2024-09-16 RX ORDER — FUROSEMIDE 40 MG
40 TABLET ORAL PRN
Qty: 60 TABLET | Refills: 3 | Status: SHIPPED | OUTPATIENT
Start: 2024-09-16

## 2024-09-16 RX ORDER — VERAPAMIL HYDROCHLORIDE 120 MG/1
120 CAPSULE, EXTENDED RELEASE ORAL DAILY
Qty: 30 CAPSULE | Refills: 0 | Status: SHIPPED | OUTPATIENT
Start: 2024-09-16 | End: 2024-10-16

## 2024-09-16 RX ORDER — ALBUTEROL SULFATE 90 UG/1
2 INHALANT RESPIRATORY (INHALATION) EVERY 6 HOURS PRN
COMMUNITY
Start: 2024-07-12

## 2024-09-16 RX ORDER — FUROSEMIDE 40 MG
40 TABLET ORAL DAILY
Status: ON HOLD | COMMUNITY
Start: 2024-07-12 | End: 2024-09-16

## 2024-09-16 RX ADMIN — INSULIN LISPRO 2 UNITS: 100 INJECTION, SOLUTION INTRAVENOUS; SUBCUTANEOUS at 12:32

## 2024-09-16 RX ADMIN — GABAPENTIN 200 MG: 100 CAPSULE ORAL at 08:19

## 2024-09-16 RX ADMIN — ACETAMINOPHEN 650 MG: 325 TABLET ORAL at 06:15

## 2024-09-16 RX ADMIN — PANTOPRAZOLE SODIUM 40 MG: 40 TABLET, DELAYED RELEASE ORAL at 06:12

## 2024-09-16 RX ADMIN — ATORVASTATIN CALCIUM 10 MG: 10 TABLET, FILM COATED ORAL at 08:18

## 2024-09-16 RX ADMIN — HEPARIN SODIUM 5000 UNITS: 5000 INJECTION INTRAVENOUS; SUBCUTANEOUS at 06:12

## 2024-09-16 RX ADMIN — INSULIN LISPRO 2 UNITS: 100 INJECTION, SOLUTION INTRAVENOUS; SUBCUTANEOUS at 08:18

## 2024-09-16 ASSESSMENT — PAIN - FUNCTIONAL ASSESSMENT: PAIN_FUNCTIONAL_ASSESSMENT: ACTIVITIES ARE NOT PREVENTED

## 2024-09-16 ASSESSMENT — PAIN DESCRIPTION - ORIENTATION: ORIENTATION: ANTERIOR

## 2024-09-16 ASSESSMENT — PAIN DESCRIPTION - DESCRIPTORS: DESCRIPTORS: SORE

## 2024-09-16 ASSESSMENT — PAIN SCALES - GENERAL: PAINLEVEL_OUTOF10: 3

## 2024-09-16 ASSESSMENT — PAIN DESCRIPTION - LOCATION: LOCATION: HEAD

## 2024-11-29 ENCOUNTER — TRANSCRIBE ORDERS (OUTPATIENT)
Dept: SCHEDULING | Age: 69
End: 2024-11-29

## 2024-11-29 DIAGNOSIS — Z12.31 ENCOUNTER FOR SCREENING MAMMOGRAM FOR MALIGNANT NEOPLASM OF BREAST: Primary | ICD-10-CM

## 2025-01-29 ENCOUNTER — HOSPITAL ENCOUNTER (OUTPATIENT)
Dept: MAMMOGRAPHY | Age: 70
Discharge: HOME | End: 2025-02-01
Payer: MEDICARE

## 2025-01-29 VITALS — BODY MASS INDEX: 35.33 KG/M2 | HEIGHT: 62 IN | WEIGHT: 192 LBS

## 2025-01-29 DIAGNOSIS — Z12.31 ENCOUNTER FOR SCREENING MAMMOGRAM FOR MALIGNANT NEOPLASM OF BREAST: ICD-10-CM

## 2025-01-29 PROCEDURE — 77063 BREAST TOMOSYNTHESIS BI: CPT

## 2025-06-12 ENCOUNTER — HOSPITAL ENCOUNTER (EMERGENCY)
Age: 70
Discharge: HOME | End: 2025-06-12
Attending: EMERGENCY MEDICINE
Payer: MEDICARE

## 2025-06-12 VITALS
RESPIRATION RATE: 20 BRPM | HEART RATE: 75 BPM | DIASTOLIC BLOOD PRESSURE: 72 MMHG | SYSTOLIC BLOOD PRESSURE: 114 MMHG | OXYGEN SATURATION: 95 % | BODY MASS INDEX: 31.89 KG/M2 | TEMPERATURE: 98.1 F | HEIGHT: 63 IN | WEIGHT: 180 LBS

## 2025-06-12 DIAGNOSIS — R07.9 CHEST PAIN, UNSPECIFIED TYPE: Primary | ICD-10-CM

## 2025-06-12 DIAGNOSIS — K21.9 GASTROESOPHAGEAL REFLUX DISEASE WITHOUT ESOPHAGITIS: ICD-10-CM

## 2025-06-12 LAB
ALBUMIN SERPL-MCNC: 4.2 G/DL (ref 3.2–4.6)
ALBUMIN/GLOB SERPL: 1.5 (ref 1–1.9)
ALP SERPL-CCNC: 111 U/L (ref 35–104)
ALT SERPL-CCNC: 15 U/L (ref 12–65)
ANION GAP SERPL CALC-SCNC: 10 MMOL/L (ref 7–16)
AST SERPL-CCNC: 18 U/L (ref 15–37)
BASOPHILS # BLD: 0.04 K/UL (ref 0–0.2)
BASOPHILS NFR BLD: 0.5 % (ref 0–2)
BILIRUB SERPL-MCNC: 0.3 MG/DL (ref 0–1.2)
BUN SERPL-MCNC: 18 MG/DL (ref 8–23)
CALCIUM SERPL-MCNC: 9.6 MG/DL (ref 8.8–10.2)
CHLORIDE SERPL-SCNC: 102 MMOL/L (ref 98–107)
CO2 SERPL-SCNC: 29 MMOL/L (ref 20–29)
CREAT SERPL-MCNC: 1.2 MG/DL (ref 0.8–1.3)
DIFFERENTIAL METHOD BLD: NORMAL
EKG ATRIAL RATE: 88 BPM
EKG DIAGNOSIS: NORMAL
EKG P AXIS: 72 DEGREES
EKG P-R INTERVAL: 153 MS
EKG Q-T INTERVAL: 373 MS
EKG QRS DURATION: 68 MS
EKG QTC CALCULATION (BAZETT): 449 MS
EKG R AXIS: 42 DEGREES
EKG T AXIS: 45 DEGREES
EKG VENTRICULAR RATE: 87 BPM
EOSINOPHIL # BLD: 0.23 K/UL (ref 0–0.8)
EOSINOPHIL NFR BLD: 2.6 % (ref 0.5–7.8)
ERYTHROCYTE [DISTWIDTH] IN BLOOD BY AUTOMATED COUNT: 13.3 % (ref 11.9–14.6)
GLOBULIN SER CALC-MCNC: 2.8 G/DL (ref 2.3–3.5)
GLUCOSE SERPL-MCNC: 142 MG/DL (ref 65–100)
HCT VFR BLD AUTO: 38.9 % (ref 35.8–46.3)
HGB BLD-MCNC: 12.8 G/DL (ref 11.7–15.4)
IMM GRANULOCYTES # BLD AUTO: 0.02 K/UL (ref 0–0.5)
IMM GRANULOCYTES NFR BLD AUTO: 0.2 % (ref 0–5)
LIPASE SERPL-CCNC: 26 U/L (ref 13–60)
LYMPHOCYTES # BLD: 3.34 K/UL (ref 0.5–4.6)
LYMPHOCYTES NFR BLD: 38.1 % (ref 13–44)
MCH RBC QN AUTO: 27.2 PG (ref 26.1–32.9)
MCHC RBC AUTO-ENTMCNC: 32.9 G/DL (ref 31.4–35)
MCV RBC AUTO: 82.6 FL (ref 82–102)
MONOCYTES # BLD: 0.69 K/UL (ref 0.1–1.3)
MONOCYTES NFR BLD: 7.9 % (ref 4–12)
NEUTS SEG # BLD: 4.44 K/UL (ref 1.7–8.2)
NEUTS SEG NFR BLD: 50.7 % (ref 43–78)
NRBC # BLD: 0 K/UL (ref 0–0.2)
PLATELET # BLD AUTO: 230 K/UL (ref 150–450)
PMV BLD AUTO: 9.9 FL (ref 9.4–12.3)
POTASSIUM SERPL-SCNC: 4.5 MMOL/L (ref 3.5–5.1)
PROT SERPL-MCNC: 7 G/DL (ref 6.3–8.2)
RBC # BLD AUTO: 4.71 M/UL (ref 4.05–5.2)
SODIUM SERPL-SCNC: 141 MMOL/L (ref 133–143)
TROPONIN T SERPL HS-MCNC: 7.5 NG/L (ref 0–14)
WBC # BLD AUTO: 8.8 K/UL (ref 4.3–11.1)

## 2025-06-12 PROCEDURE — 83690 ASSAY OF LIPASE: CPT

## 2025-06-12 PROCEDURE — 99284 EMERGENCY DEPT VISIT MOD MDM: CPT

## 2025-06-12 PROCEDURE — 80053 COMPREHEN METABOLIC PANEL: CPT

## 2025-06-12 PROCEDURE — 84484 ASSAY OF TROPONIN QUANT: CPT

## 2025-06-12 PROCEDURE — 85025 COMPLETE CBC W/AUTO DIFF WBC: CPT

## 2025-06-12 PROCEDURE — 6360000002 HC RX W HCPCS: Performed by: EMERGENCY MEDICINE

## 2025-06-12 PROCEDURE — 6370000000 HC RX 637 (ALT 250 FOR IP): Performed by: EMERGENCY MEDICINE

## 2025-06-12 PROCEDURE — 93005 ELECTROCARDIOGRAM TRACING: CPT | Performed by: EMERGENCY MEDICINE

## 2025-06-12 PROCEDURE — 93010 ELECTROCARDIOGRAM REPORT: CPT | Performed by: INTERNAL MEDICINE

## 2025-06-12 RX ORDER — ASPIRIN 81 MG/1
162 TABLET, CHEWABLE ORAL
Status: COMPLETED | OUTPATIENT
Start: 2025-06-12 | End: 2025-06-12

## 2025-06-12 RX ORDER — HYOSCYAMINE SULFATE 0.12 MG/1
0.25 TABLET SUBLINGUAL
Status: COMPLETED | OUTPATIENT
Start: 2025-06-12 | End: 2025-06-12

## 2025-06-12 RX ORDER — LIDOCAINE HYDROCHLORIDE 40 MG/ML
4 INJECTION, SOLUTION RETROBULBAR ONCE
Status: COMPLETED | OUTPATIENT
Start: 2025-06-12 | End: 2025-06-12

## 2025-06-12 RX ORDER — MAGNESIUM HYDROXIDE/ALUMINUM HYDROXICE/SIMETHICONE 120; 1200; 1200 MG/30ML; MG/30ML; MG/30ML
30 SUSPENSION ORAL
Status: COMPLETED | OUTPATIENT
Start: 2025-06-12 | End: 2025-06-12

## 2025-06-12 RX ORDER — LIDOCAINE HYDROCHLORIDE 20 MG/ML
15 SOLUTION OROPHARYNGEAL
Status: COMPLETED | OUTPATIENT
Start: 2025-06-12 | End: 2025-06-12

## 2025-06-12 RX ORDER — HYOSCYAMINE SULFATE 0.12 MG/1
0.25 TABLET ORAL EVERY 6 HOURS PRN
Qty: 20 TABLET | Refills: 1 | Status: SHIPPED | OUTPATIENT
Start: 2025-06-12

## 2025-06-12 RX ORDER — ONDANSETRON 8 MG/1
8 TABLET, ORALLY DISINTEGRATING ORAL EVERY 8 HOURS PRN
Qty: 12 TABLET | Refills: 2 | Status: SHIPPED | OUTPATIENT
Start: 2025-06-12

## 2025-06-12 RX ORDER — OMEPRAZOLE 20 MG/1
20 CAPSULE, DELAYED RELEASE ORAL
Qty: 90 CAPSULE | Refills: 3 | Status: SHIPPED | OUTPATIENT
Start: 2025-06-12

## 2025-06-12 RX ADMIN — LIDOCAINE HYDROCHLORIDE 4 ML: 40 INJECTION, SOLUTION RETROBULBAR at 03:33

## 2025-06-12 RX ADMIN — HYOSCYAMINE SULFATE 0.25 MG: 0.12 TABLET ORAL; SUBLINGUAL at 02:51

## 2025-06-12 RX ADMIN — LIDOCAINE HYDROCHLORIDE 15 ML: 20 SOLUTION ORAL at 02:54

## 2025-06-12 RX ADMIN — ALUMINUM HYDROXIDE, MAGNESIUM HYDROXIDE, AND SIMETHICONE 30 ML: 200; 200; 20 SUSPENSION ORAL at 02:54

## 2025-06-12 RX ADMIN — ASPIRIN 81 MG 162 MG: 81 TABLET ORAL at 02:53

## 2025-06-12 ASSESSMENT — ENCOUNTER SYMPTOMS
EYE DISCHARGE: 0
VOMITING: 1
TROUBLE SWALLOWING: 0
NAUSEA: 1
SHORTNESS OF BREATH: 1
FACIAL SWELLING: 0
COLOR CHANGE: 0
ABDOMINAL PAIN: 0
VOICE CHANGE: 1
COUGH: 0
BACK PAIN: 0
RHINORRHEA: 0
EYE REDNESS: 0

## 2025-06-12 ASSESSMENT — PAIN DESCRIPTION - DESCRIPTORS
DESCRIPTORS: BURNING
DESCRIPTORS: BURNING

## 2025-06-12 ASSESSMENT — PAIN DESCRIPTION - LOCATION
LOCATION: CHEST
LOCATION: CHEST

## 2025-06-12 ASSESSMENT — PAIN SCALES - GENERAL
PAINLEVEL_OUTOF10: 2
PAINLEVEL_OUTOF10: 8

## 2025-06-12 ASSESSMENT — PAIN - FUNCTIONAL ASSESSMENT
PAIN_FUNCTIONAL_ASSESSMENT: NONE - DENIES PAIN
PAIN_FUNCTIONAL_ASSESSMENT: 0-10

## 2025-06-12 ASSESSMENT — PAIN DESCRIPTION - ORIENTATION
ORIENTATION: MID
ORIENTATION: MID

## 2025-06-12 NOTE — ED NOTES
Pt and/or family advised of intended use and desired effects of GI Cocktail.  Pt advised to drink medication in one setting, not to sip medication.   Medication will numb the throat and tongue and sipping medication can lead to aspiration.  Pt and/or family verbalized understanding.    
None known

## 2025-06-12 NOTE — ED TRIAGE NOTES
Pt to ED with c/o chest pain. PT states started around 0130. Pt states woke her from her sleep. PT states burning to the center of her chest. PT states nausea and vomiting. Pt states shortness of breath. Pt denies cardiac hx. Pt alert and in acute pain at this time. Pt states took baking soda and peppermint candy at home to get rid of the \"heart burn\" with no relief.

## 2025-06-12 NOTE — ED PROVIDER NOTES
HENT:      Head: Normocephalic and atraumatic.      Right Ear: External ear normal.      Left Ear: External ear normal.      Nose: No rhinorrhea.   Eyes:      General: No scleral icterus.        Right eye: No discharge.         Left eye: No discharge.      Extraocular Movements: Extraocular movements intact.      Conjunctiva/sclera: Conjunctivae normal.   Cardiovascular:      Rate and Rhythm: Normal rate and regular rhythm.   Pulmonary:      Effort: Pulmonary effort is normal. No respiratory distress.      Breath sounds: Normal breath sounds.   Abdominal:      Palpations: Abdomen is soft. There is no fluid wave or pulsatile mass.      Tenderness: There is no abdominal tenderness. There is no guarding or rebound.   Musculoskeletal:         General: No deformity or signs of injury. Normal range of motion.      Cervical back: Normal range of motion and neck supple. No rigidity.   Skin:     General: Skin is warm and dry.      Coloration: Skin is not jaundiced or pale.   Neurological:      General: No focal deficit present.      Mental Status: She is alert and oriented to person, place, and time.   Psychiatric:         Mood and Affect: Mood normal.         Behavior: Behavior normal.         Thought Content: Thought content normal.        Procedures     EKG 12 Lead    Date/Time: 6/12/2025 2:40 AM    Performed by: Carlos Mccullough MD  Authorized by: Carlos Mccullough MD    Previous ECG:     Previous ECG:  Compared to current    Similarity:  No change  Interpretation:     Interpretation: non-specific    Rate:     ECG rate:  87    ECG rate assessment: normal    Rhythm:     Rhythm: sinus rhythm    Ectopy:     Ectopy: none    QRS:     QRS axis:  Normal    QRS intervals:  Normal    QRS conduction: normal    ST segments:     ST segments:  Normal  T waves:     T waves: non-specific        Orders Placed This Encounter   Procedures    CBC with Auto Differential    Comprehensive Metabolic Panel    Troponin    Lipase    EKG 12 Lead  Transportation: No   Physical Activity: Inactive (11/11/2024)    Received from [email or web address]    Physical Activity     Days of Exercise per Week: 0 days     On average, how many minutes do you exercise per day?: 0     Total Minutes of Exercise Per Week: 0   Stress: No Stress Concern Present (3/12/2025)    Received from [email or web address]    Stress     Feeling of Stress : Not at all   Social Connections: Socially Integrated (3/12/2025)    Received from [email or web address]    Social Connections     Frequency of Communication with Friends and Family: More than three times a week     Frequency of Social Gatherings with Friends and Family: More than three times a week   Intimate Partner Violence: Not At Risk (3/12/2025)    Received from [email or web address]    Intimate Partner Violence     Fear of Current or Ex-Partner: No     Emotionally Abused: No     Physically Abused: No     Sexually Abused: No   Housing Stability: Not At Risk (3/12/2025)    Received from [email or web address]    Housing Stability     Was there a time when you did not have a steady place to sleep: No     Worried that the place you are staying is making you sick: No        Previous Medications    ALBUTEROL SULFATE HFA (PROVENTIL;VENTOLIN;PROAIR) 108 (90 BASE) MCG/ACT INHALER    Inhale 2 puffs into the lungs every 6 hours as needed    ASPIRIN 81 MG EC TABLET    Take 1 tablet by mouth in the morning.    ATORVASTATIN (LIPITOR) 10 MG TABLET    Take 1 tablet by mouth daily    CHOLECALCIFEROL 75 MCG (3000 UT) TABS    Take by mouth    DOXAZOSIN (CARDURA) 1 MG TABLET    Take 1 tablet by mouth nightly    FUROSEMIDE (LASIX) 40 MG TABLET    Take 1 tablet by mouth as needed (weight gain) Please take 1 tablet if you notice weight gain > 5lbs in 24hrs with leg swelling.    GABAPENTIN (NEURONTIN) 600 MG TABLET    Take 0.5 tablets by mouth 3 times daily for 30 days.    INSULIN NPH (HUMULIN N;NOVOLIN N) 100 UNIT/ML INJECTION VIAL    Inject into the skin 2 times daily (before meals)    METFORMIN